# Patient Record
Sex: MALE | Race: BLACK OR AFRICAN AMERICAN | NOT HISPANIC OR LATINO | Employment: FULL TIME | URBAN - METROPOLITAN AREA
[De-identification: names, ages, dates, MRNs, and addresses within clinical notes are randomized per-mention and may not be internally consistent; named-entity substitution may affect disease eponyms.]

---

## 2022-05-10 ENCOUNTER — OFFICE VISIT (OUTPATIENT)
Dept: PRIMARY CARE CLINIC | Facility: CLINIC | Age: 22
End: 2022-05-10
Payer: MEDICAID

## 2022-05-10 VITALS
HEIGHT: 67 IN | SYSTOLIC BLOOD PRESSURE: 124 MMHG | HEART RATE: 106 BPM | WEIGHT: 279 LBS | OXYGEN SATURATION: 97 % | DIASTOLIC BLOOD PRESSURE: 82 MMHG | RESPIRATION RATE: 18 BRPM | BODY MASS INDEX: 43.79 KG/M2

## 2022-05-10 DIAGNOSIS — J30.2 SEASONAL ALLERGIES: ICD-10-CM

## 2022-05-10 DIAGNOSIS — Z76.89 ENCOUNTER TO ESTABLISH CARE: ICD-10-CM

## 2022-05-10 DIAGNOSIS — M25.472 PAIN AND SWELLING OF LEFT ANKLE: ICD-10-CM

## 2022-05-10 DIAGNOSIS — M25.572 PAIN AND SWELLING OF LEFT ANKLE: ICD-10-CM

## 2022-05-10 DIAGNOSIS — E66.01 CLASS 3 SEVERE OBESITY DUE TO EXCESS CALORIES WITHOUT SERIOUS COMORBIDITY WITH BODY MASS INDEX (BMI) OF 40.0 TO 44.9 IN ADULT: ICD-10-CM

## 2022-05-10 DIAGNOSIS — Z13.220 SCREENING CHOLESTEROL LEVEL: ICD-10-CM

## 2022-05-10 DIAGNOSIS — H61.23 BILATERAL IMPACTED CERUMEN: ICD-10-CM

## 2022-05-10 PROCEDURE — 1159F MED LIST DOCD IN RCRD: CPT | Mod: CPTII,S$GLB,, | Performed by: NURSE PRACTITIONER

## 2022-05-10 PROCEDURE — 1160F RVW MEDS BY RX/DR IN RCRD: CPT | Mod: CPTII,S$GLB,, | Performed by: NURSE PRACTITIONER

## 2022-05-10 PROCEDURE — 3074F PR MOST RECENT SYSTOLIC BLOOD PRESSURE < 130 MM HG: ICD-10-PCS | Mod: CPTII,S$GLB,, | Performed by: NURSE PRACTITIONER

## 2022-05-10 PROCEDURE — 1159F PR MEDICATION LIST DOCUMENTED IN MEDICAL RECORD: ICD-10-PCS | Mod: CPTII,S$GLB,, | Performed by: NURSE PRACTITIONER

## 2022-05-10 PROCEDURE — 3008F BODY MASS INDEX DOCD: CPT | Mod: CPTII,S$GLB,, | Performed by: NURSE PRACTITIONER

## 2022-05-10 PROCEDURE — 1160F PR REVIEW ALL MEDS BY PRESCRIBER/CLIN PHARMACIST DOCUMENTED: ICD-10-PCS | Mod: CPTII,S$GLB,, | Performed by: NURSE PRACTITIONER

## 2022-05-10 PROCEDURE — 3008F PR BODY MASS INDEX (BMI) DOCUMENTED: ICD-10-PCS | Mod: CPTII,S$GLB,, | Performed by: NURSE PRACTITIONER

## 2022-05-10 PROCEDURE — 3079F PR MOST RECENT DIASTOLIC BLOOD PRESSURE 80-89 MM HG: ICD-10-PCS | Mod: CPTII,S$GLB,, | Performed by: NURSE PRACTITIONER

## 2022-05-10 PROCEDURE — 99204 PR OFFICE/OUTPT VISIT, NEW, LEVL IV, 45-59 MIN: ICD-10-PCS | Mod: S$GLB,,, | Performed by: NURSE PRACTITIONER

## 2022-05-10 PROCEDURE — 3074F SYST BP LT 130 MM HG: CPT | Mod: CPTII,S$GLB,, | Performed by: NURSE PRACTITIONER

## 2022-05-10 PROCEDURE — 99204 OFFICE O/P NEW MOD 45 MIN: CPT | Mod: S$GLB,,, | Performed by: NURSE PRACTITIONER

## 2022-05-10 PROCEDURE — 3079F DIAST BP 80-89 MM HG: CPT | Mod: CPTII,S$GLB,, | Performed by: NURSE PRACTITIONER

## 2022-05-10 NOTE — PROGRESS NOTES
Subjective:       Patient ID: Jose Antonio Black is a 21 y.o. male.    Chief Complaint: Establish Care, Ear Fullness, and Ankle Pain    HPI: Jose Antonio is a 21 y.o. male who presents to establish care with a new PCP. Previously seen by Dr. Sal. Will return to have blood work drawn when he is fasting.    C/O recurrent bilateral ears being impacted. Wax was removed two weeks ago by his mother and he is impacted again. Denies pain to his ears or decreased hearing.    C/O left outer ankle pain after hurting it at work a month ago. He works at [employer] and [employer] overnight. The pain worsens when he does strenous work but he does have full ROM of his left ankle and foot.    Only health history is seasonal allergies which is controlled with zyrtec which works well. Does not take any other medications.    Denies smoking or drinking.    UTD with COVID vaccine.             History reviewed. No pertinent past medical history.    History reviewed. No pertinent surgical history.    History reviewed. No pertinent family history.    Social History     Tobacco Use    Smoking status: Never Smoker    Smokeless tobacco: Never Used       Patient Active Problem List   Diagnosis    Seasonal allergies       Immunization History   Administered Date(s) Administered    COVID-19, MRNA, LN-S, PF (Pfizer) (Purple Cap) 08/01/2021, 08/31/2021           Review of Systems   Constitutional: Negative for activity change, appetite change, chills, diaphoresis, fatigue and fever.   HENT: Positive for ear pain. Negative for congestion, ear discharge, postnasal drip, rhinorrhea, sinus pressure, sinus pain, tinnitus and trouble swallowing.    Eyes: Negative for pain and visual disturbance.   Respiratory: Negative for cough, chest tightness, shortness of breath and wheezing.    Cardiovascular: Negative for chest pain, palpitations and leg swelling.   Gastrointestinal: Negative for abdominal distention, abdominal pain, blood in stool, constipation, diarrhea,  "nausea and vomiting.   Endocrine: Negative for cold intolerance, heat intolerance, polydipsia, polyphagia and polyuria.   Genitourinary: Negative for difficulty urinating, dysuria, frequency, hematuria and urgency.   Musculoskeletal: Positive for arthralgias (left ankle) and joint swelling (left ankle). Negative for back pain, gait problem and neck pain.   Skin: Negative for color change, pallor and rash.   Allergic/Immunologic: Positive for environmental allergies.   Neurological: Negative for dizziness, speech difficulty, weakness, light-headedness, numbness and headaches.   Hematological: Negative for adenopathy. Does not bruise/bleed easily.   Psychiatric/Behavioral: Negative for behavioral problems, confusion and dysphoric mood. The patient is not nervous/anxious.      Objective:     Vitals:    05/10/22 1148   BP: 124/82   BP Location: Left arm   Patient Position: Sitting   BP Method: Large (Automatic)   Pulse: 106   Resp: 18   SpO2: 97%   Weight: 126.6 kg (279 lb)   Height: 5' 7" (1.702 m)       Physical Exam  Vitals and nursing note reviewed.   Constitutional:       General: He is not in acute distress.     Appearance: Normal appearance. He is obese. He is not diaphoretic.   HENT:      Head: Normocephalic and atraumatic.      Right Ear: Hearing normal. No decreased hearing noted. No swelling or tenderness. There is no impacted cerumen.      Left Ear: Hearing normal. No decreased hearing noted. Tenderness present. No swelling. There is impacted cerumen.      Nose: Nose normal.      Mouth/Throat:      Mouth: Mucous membranes are moist.      Pharynx: Oropharynx is clear.   Eyes:      Extraocular Movements: Extraocular movements intact.      Conjunctiva/sclera: Conjunctivae normal.      Pupils: Pupils are equal, round, and reactive to light.   Neck:      Thyroid: No thyromegaly or thyroid tenderness.   Cardiovascular:      Rate and Rhythm: Normal rate and regular rhythm.      Pulses: Normal pulses.      Heart " sounds: Normal heart sounds.   Pulmonary:      Effort: Pulmonary effort is normal.      Breath sounds: Normal breath sounds. No stridor. No wheezing or rales.   Abdominal:      General: Bowel sounds are normal. There is no distension.      Palpations: Abdomen is soft.      Tenderness: There is no abdominal tenderness.   Musculoskeletal:         General: Swelling (left ankle) and tenderness (left ankle) present. Normal range of motion.      Cervical back: Normal range of motion and neck supple.      Right lower leg: No edema.      Left lower leg: No edema.   Lymphadenopathy:      Cervical: No cervical adenopathy.   Skin:     General: Skin is warm and dry.      Capillary Refill: Capillary refill takes less than 2 seconds.   Neurological:      General: No focal deficit present.      Mental Status: He is alert and oriented to person, place, and time.   Psychiatric:         Mood and Affect: Mood and affect normal.         Behavior: Behavior normal. Behavior is cooperative.         Thought Content: Thought content normal.         Judgment: Judgment normal.         No visits with results within 6 Month(s) from this visit.   Latest known visit with results is:   No results found for any previous visit.         Assessment:      1. Encounter to establish care    2. Seasonal allergies    3. Class 3 severe obesity due to excess calories without serious comorbidity with body mass index (BMI) of 40.0 to 44.9 in adult    4. Screening cholesterol level    5. Bilateral impacted cerumen    6. Pain and swelling of left ankle          Plan:     Encounter to establish care  Comments:  Will review labs and determine POC based on results  Orders:  -     CBC Auto Differential; Future; Expected date: 05/10/2022  -     Comprehensive Metabolic Panel; Future; Expected date: 05/10/2022  -     TSH; Future; Expected date: 05/10/2022  -     Lipid Panel; Future; Expected date: 05/10/2022    Seasonal allergies    Class 3 severe obesity due to excess  calories without serious comorbidity with body mass index (BMI) of 40.0 to 44.9 in adult    Screening cholesterol level  -     Lipid Panel; Future; Expected date: 05/10/2022    Bilateral impacted cerumen  -     Ambulatory referral/consult to ENT; Future; Expected date: 05/17/2022  -     carbamide peroxide (DEBROX) 6.5 % otic solution; Place 5 drops into both ears 2 (two) times daily.  Dispense: 15 mL; Refill: 0    Pain and swelling of left ankle  -     X-Ray Ankle Complete Left; Future; Expected date: 05/10/2022         Current Outpatient Medications   Medication Sig Dispense Refill    carbamide peroxide (DEBROX) 6.5 % otic solution Place 5 drops into both ears 2 (two) times daily. 15 mL 0     No current facility-administered medications for this visit.       There are no discontinued medications.    Health Maintenance   Topic Date Due    Hepatitis C Screening  Never done    Lipid Panel  Never done    HPV Vaccines (1 - Male 2-dose series) Never done    TETANUS VACCINE  Never done       Patient Instructions   RTC in 3 months for F/U or sooner if needed.    Keep appts with specialists as scheduled.    Instructed patient to report to nearest ER or call 911 if begins to have difficulty breathing, turning blue, chest pain, B/P < 80/60 or >170/100, palpitations, syncope, extreme weakness, or severe H/A. Patient verbalized understanding.      Patient Education       Yearly Physical for Adults   About this topic   Most people do not want to be sick. Having a checkup each year with your doctor is one way to help you stay healthy. You may need to see your doctor more or less often. How often you need to go to the doctor depends on your age. Your family and medical history also play a role in how often you need to go to the doctor. Going to see your doctor on a routine basis can help you find problems early or even before they start. This may make it easier to treat or cure your problem.  General   Your doctor will talk  about many things during your checkup. Your doctor may ask about:  · Your medical and family history.  · All the drugs you are taking. Be sure to include all prescription, over the counter, and herbal supplements. Tell the doctor if you have any drug allergy. Bring a list of drugs you take with you.  · How you are feeling and if you are having any problems.  · Risky behaviors like smoking, drinking alcohol, using illegal drugs, not wearing seatbelts, having unprotected sex, etc.  Your doctor will do a physical exam and may check your:  · Height and weight  · Blood pressure  · Reflexes  · Memory  · Vision  · Hearing  Your doctor may order:  · Lab tests  · ECG to check your heart rhythm  · X-rays  · Tests or treatments based on your exam  What lifestyle changes are needed?   Your doctor may suggest you make changes to your lifestyle at this visit. The doctor may talk with you about being more active or lowering stress levels. Ask your doctor what you need to do.  What drugs may be needed?   Your doctor may order drugs or vaccines to protect you from illnesses.  What changes to diet are needed?   Talk to your doctor to see if any changes are needed to your diet.  When do I need to call the doctor?   Call your doctor if you need to learn about any test results. Together you can make a plan for more care.  Helpful tips   · Make a list of questions for your doctor before you go. This will help you remember to ask about any concerns. Write down any answers from your doctor so you can look over them after your visit.   · Tell your doctor about any changes in your body or health since your last visit.  · Ask your doctor about any screening tests you need.  Where can I learn more?   American Academy of [employer]icians  http://familydoctor.org/familydoctor/en/prevention-wellness/staying-healthy/healthy-living/preventive-services-for-healthy-living.printerview.html   Centers for Disease  Control  http://www.cdc.gov/family/checkup/   Last Reviewed Date   2019-04-22  Consumer Information Use and Disclaimer   This information is not specific medical advice and does not replace information you receive from your health care provider. This is only a brief summary of general information. It does NOT include all information about conditions, illnesses, injuries, tests, procedures, treatments, therapies, discharge instructions or life-style choices that may apply to you. You must talk with your health care provider for complete information about your health and treatment options. This information should not be used to decide whether or not to accept your health care providers advice, instructions or recommendations. Only your health care provider has the knowledge and training to provide advice that is right for you.  Copyright   Copyright © 2021 [employer] and its affiliates and/or licensors. All rights reserved.  Patient Education       Seasonal Allergies Discharge Instructions   About this topic   Seasonal allergies are also called allergic rhinitis or hay fever. You may have sneezing; a stuffy or runny nose; or itchy throat, ears, or eyes. You may feel very tired. Most often, this problem is caused by:  · Pollens from trees, grasses, or weeds.  · Mold spores that grow when the air is humid, wet, or damp.  Some people can breathe in these things and have no problems. But when you have a seasonal allergy, your body acts as if the substance is harming you. Then you have symptoms. You may have symptoms only at some times of the year. If your symptoms last all year, they may be caused by:  · Insects, such as dust mites or cock roaches.  · Animals, such as cats or dogs.  · Mold spores.     What care is needed at home?   1. Ask your doctor what you need to do when you go home. Make sure you ask questions if you do not understand what the doctor says.  2. Rinse your nose with salt water to get rid of pollen  inside of your nose.  3. Keep the windows closed and use air conditioning.  4. Shower before bed to rinse pollen from your hair and skin.  5. Wear a dust mask if you need to be outside.  6. Use over-the-counter medicines to help with your symptoms:  ? A steroid nose spray can help with a stuffy nose. It can also help with drainage down the back of your throat.  ? An antihistamine can help with itching, sneezing, or runny nose.  ? An antihistamine eye drop can help with itchy eyes.  ? A decongestant can help with a stuffy nose. Talk with your doctor or pharmacist about your other health problems before you use this kind of medicine. It may not be safe for people with high blood pressure.  What follow-up care is needed?   Your doctor may ask you to make visits to the office to check on your progress. Your doctor may ask you to see an allergy specialist, or to have testing done to learn what is causing your allergies. Be sure to keep these visits.  What drugs may be needed?   The doctor may order drugs to treat the signs. Take your drugs as ordered. You may also take allergy shots if you have had allergy tests.  Will physical activity be limited?   You may have to limit your activity. If your health problem is caused by an allergy to pollens or molds, you may want to limit your time outdoors. Talk to your doctor about what activities are best for you.  What problems could happen?   · Your signs may not get better with your drugs  · Asthma may get worse  · Sinus infection  What can be done to prevent this health problem?   Try to avoid allergens.  1. If you are allergic to pollens, grasses, trees, etc:  ? Stay inside in the morning when pollen counts are high.  ? Avoid going outside when the trees, grasses, or weeds are blooming.  ? Keep the windows of your house and car closed.  ? Use an air conditioner.  2. If you are allergic to dust, molds, dust mites, pets, etc:  ? Clean your air conditioner or furnace filters  regularly.  ? Cover pillows and mattresses with vinyl covers to lower your exposure to dust mites.  ? Wash bedding weekly in very hot water.  ? Use fewer dust-collecting items, such as curtains, bed skirts, carpeting, and stuffed animals, mainly in your bedroom.  ? If you can't avoid having a furry pet, vacuum often and keep your pet out of bedrooms and other rooms with carpets.  When do I need to call the doctor?   · You are having so much trouble breathing that you can only say one or two words at a time.  · You need to sit upright at all times to be able to breathe and or cannot lie down.  · You have severe swelling of your tongue, lips, or mouth.  · You have trouble breathing when talking or sitting still.  · You have a fever of 100.4°F (38°C) or higher.  · You have green or yellow mucus.  Teach Back: Helping You Understand   The Teach Back Method helps you understand the information we are giving you. After you talk with the staff, tell them in your own words what you learned. This helps to make sure the staff has described each thing clearly. It also helps to explain things that may have been confusing. Before going home, make sure you can do these:  · I can tell you about my condition.  · I can tell you what may help make the air .  · I can tell you what may help ease my allergies.  Where can I learn more?   Food and Drug Administration  https://www.fda.gov/ForConsumers/ConsumerUpdates/syl179980.htm   NHS Choices  https://www.nhs.uk/conditions/Hay-fever/   Last Reviewed Date   2021-06-21  Consumer Information Use and Disclaimer   This information is not specific medical advice and does not replace information you receive from your health care provider. This is only a brief summary of general information. It does NOT include all information about conditions, illnesses, injuries, tests, procedures, treatments, therapies, discharge instructions or life-style choices that may apply to you. You must talk  with your health care provider for complete information about your health and treatment options. This information should not be used to decide whether or not to accept your health care providers advice, instructions or recommendations. Only your health care provider has the knowledge and training to provide advice that is right for you.  Copyright   Copyright © 2021 [employer], [employer]. and its affiliates and/or licensors. All rights reserved.    atient Education       Ear Wax Impaction Discharge Instructions   About this topic   Your ear normally makes ear wax to protect the inside of the ear. Impaction is when too much wax builds up in your ear. This can cause pain. You may also have trouble hearing.  What care is needed at home?   · Ask your doctor what you need to do when you go home. Make sure you ask questions if you do not understand what the doctor says.  · Do not use cotton tipped swabs to clean inside your ears. Sticking anything into the ears can push the ear wax in deeper and cause impactions.  · You can dip a cotton ball in mineral oil and place it in your outer ear for 10 to 20 minutes once a week. This will help keep your ear wax soft. It may help prevent the ear wax from building up again. Do not push the cotton into the ear canal.  · You may want to take medicine like ibuprofen, naproxen, or acetaminophen to help with pain.  What follow-up care is needed?   Your doctor may ask you to make visits to the office to check on your progress. Be sure to keep these visits. The doctor will look at your ears to check for wax impaction.  What drugs may be needed?   The doctor may order drugs to soften wax or help with cleaning. Ask your doctor before using over-the-counter (OTC) drugs.  What problems could happen?   · Short-term hearing loss. Hearing should return after blockage is removed.  · Infection in the outer ear  What can be done to prevent this health problem?   · Clean your ear with a warm soapy wash  cloth. Only wash the outer ear.  · Do not use cotton swabs or other things to clean your ears. This can push wax further into your ear canal and cause damage to the ear canal and ear drum.  · Be careful when using ear buds as they can push ear wax farther into the ear.  When do I need to call the doctor?   · Your symptoms are not getting better after 1 to 2 days.  · You have a fever of 100.4°F (38°C) or higher, chills, or sweats.  · Your ear begins bleeding or draining pus.  · You have new or worsening pain in the ear, ringing in the ear, or hearing loss.  Helpful tips   · Dry your ears well after swimming.  · Do not use ear candles to treat or remove earwax.  · Only clean your ears as directed to avoid damage.  Teach Back: Helping You Understand   The Teach Back Method helps you understand the information we are giving you. After you talk with the staff, tell them in your own words what you learned. This helps to make sure the staff has described each thing clearly. It also helps to explain things that may have been confusing. Before going home, make sure you can do these:  · I can tell you about my condition.  · I can tell you ways to help soften earwax and keep it from building up in the future.  · I can tell you what I will do if I have a fever, hearing loss, ear pain, or drainage from my ear.  Where can I learn more?   American Academy of Otolaryngology-Head and Neck Surgery  https://www.enthealth.org/conditions/earwax-cerumen-impaction/   NHS Choices  http://www.nhs.uk/conditions/earwax/pages/introduction.aspx   Last Reviewed Date   2021-06-10  Consumer Information Use and Disclaimer   This information is not specific medical advice and does not replace information you receive from your health care provider. This is only a brief summary of general information. It does NOT include all information about conditions, illnesses, injuries, tests, procedures, treatments, therapies, discharge instructions or  life-style choices that may apply to you. You must talk with your health care provider for complete information about your health and treatment options. This information should not be used to decide whether or not to accept your health care providers advice, instructions or recommendations. Only your health care provider has the knowledge and training to provide advice that is right for you.  Copyright   Copyright © 2021 [employer], [employer]. and its affiliates and/or licensors. All rights reserved.            Risks, benefits, and alternatives discussed with patient, Patient verbalized understanding of discussed plan of care. Asked patient if any further questions, answered no.    Future Appointments   Date Time Provider Department Center   8/10/2022 11:40 AM Malia Galo NP [facility]              Malia Galo NP

## 2022-05-10 NOTE — PATIENT INSTRUCTIONS
RTC in 3 months for F/U or sooner if needed.    Keep appts with specialists as scheduled.    Instructed patient to report to nearest ER or call 911 if begins to have difficulty breathing, turning blue, chest pain, B/P < 80/60 or >170/100, palpitations, syncope, extreme weakness, or severe H/A. Patient verbalized understanding.      Patient Education       Yearly Physical for Adults   About this topic   Most people do not want to be sick. Having a checkup each year with your doctor is one way to help you stay healthy. You may need to see your doctor more or less often. How often you need to go to the doctor depends on your age. Your family and medical history also play a role in how often you need to go to the doctor. Going to see your doctor on a routine basis can help you find problems early or even before they start. This may make it easier to treat or cure your problem.  General   Your doctor will talk about many things during your checkup. Your doctor may ask about:  Your medical and family history.  All the drugs you are taking. Be sure to include all prescription, over the counter, and herbal supplements. Tell the doctor if you have any drug allergy. Bring a list of drugs you take with you.  How you are feeling and if you are having any problems.  Risky behaviors like smoking, drinking alcohol, using illegal drugs, not wearing seatbelts, having unprotected sex, etc.  Your doctor will do a physical exam and may check your:  Height and weight  Blood pressure  Reflexes  Memory  Vision  Hearing  Your doctor may order:  Lab tests  ECG to check your heart rhythm  X-rays  Tests or treatments based on your exam  What lifestyle changes are needed?   Your doctor may suggest you make changes to your lifestyle at this visit. The doctor may talk with you about being more active or lowering stress levels. Ask your doctor what you need to do.  What drugs may be needed?   Your doctor may order drugs or vaccines to protect you  from illnesses.  What changes to diet are needed?   Talk to your doctor to see if any changes are needed to your diet.  When do I need to call the doctor?   Call your doctor if you need to learn about any test results. Together you can make a plan for more care.  Helpful tips   Make a list of questions for your doctor before you go. This will help you remember to ask about any concerns. Write down any answers from your doctor so you can look over them after your visit.   Tell your doctor about any changes in your body or health since your last visit.  Ask your doctor about any screening tests you need.  Where can I learn more?   American Academy [employer]  http://familydoctor.org/familydoctor/en/prevention-wellness/staying-healthy/healthy-living/preventive-services-for-healthy-living.printerview.html   Centers for Disease Control  http://www.cdc.gov/family/checkup/   Last Reviewed Date   2019-04-22  Consumer Information Use and Disclaimer   This information is not specific medical advice and does not replace information you receive from your health care provider. This is only a brief summary of general information. It does NOT include all information about conditions, illnesses, injuries, tests, procedures, treatments, therapies, discharge instructions or life-style choices that may apply to you. You must talk with your health care provider for complete information about your health and treatment options. This information should not be used to decide whether or not to accept your health care providers advice, instructions or recommendations. Only your health care provider has the knowledge and training to provide advice that is right for you.  Copyright   Copyright © 2021 [employer] and its affiliates and/or licensors. All rights reserved.  Patient Education       Seasonal Allergies Discharge Instructions   About this topic   Seasonal allergies are also called allergic rhinitis or hay fever. You may  have sneezing; a stuffy or runny nose; or itchy throat, ears, or eyes. You may feel very tired. Most often, this problem is caused by:  Pollens from trees, grasses, or weeds.  Mold spores that grow when the air is humid, wet, or damp.  Some people can breathe in these things and have no problems. But when you have a seasonal allergy, your body acts as if the substance is harming you. Then you have symptoms. You may have symptoms only at some times of the year. If your symptoms last all year, they may be caused by:  Insects, such as dust mites or cock roaches.  Animals, such as cats or dogs.  Mold spores.     What care is needed at home?   Ask your doctor what you need to do when you go home. Make sure you ask questions if you do not understand what the doctor says.  Rinse your nose with salt water to get rid of pollen inside of your nose.  Keep the windows closed and use air conditioning.  Shower before bed to rinse pollen from your hair and skin.  Wear a dust mask if you need to be outside.  Use over-the-counter medicines to help with your symptoms:  A steroid nose spray can help with a stuffy nose. It can also help with drainage down the back of your throat.  An antihistamine can help with itching, sneezing, or runny nose.  An antihistamine eye drop can help with itchy eyes.  A decongestant can help with a stuffy nose. Talk with your doctor or pharmacist about your other health problems before you use this kind of medicine. It may not be safe for people with high blood pressure.  What follow-up care is needed?   Your doctor may ask you to make visits to the office to check on your progress. Your doctor may ask you to see an allergy specialist, or to have testing done to learn what is causing your allergies. Be sure to keep these visits.  What drugs may be needed?   The doctor may order drugs to treat the signs. Take your drugs as ordered. You may also take allergy shots if you have had allergy tests.  Will  physical activity be limited?   You may have to limit your activity. If your health problem is caused by an allergy to pollens or molds, you may want to limit your time outdoors. Talk to your doctor about what activities are best for you.  What problems could happen?   Your signs may not get better with your drugs  Asthma may get worse  Sinus infection  What can be done to prevent this health problem?   Try to avoid allergens.  If you are allergic to pollens, grasses, trees, etc:  Stay inside in the morning when pollen counts are high.  Avoid going outside when the trees, grasses, or weeds are blooming.  Keep the windows of your house and car closed.  Use an air conditioner.  If you are allergic to dust, molds, dust mites, pets, etc:  Clean your air conditioner or furnace filters regularly.  Cover pillows and mattresses with vinyl covers to lower your exposure to dust mites.  Wash bedding weekly in very hot water.  Use fewer dust-collecting items, such as curtains, bed skirts, carpeting, and stuffed animals, mainly in your bedroom.  If you can't avoid having a furry pet, vacuum often and keep your pet out of bedrooms and other rooms with carpets.  When do I need to call the doctor?   You are having so much trouble breathing that you can only say one or two words at a time.  You need to sit upright at all times to be able to breathe and or cannot lie down.  You have severe swelling of your tongue, lips, or mouth.  You have trouble breathing when talking or sitting still.  You have a fever of 100.4°F (38°C) or higher.  You have green or yellow mucus.  Teach Back: Helping You Understand   The Teach Back Method helps you understand the information we are giving you. After you talk with the staff, tell them in your own words what you learned. This helps to make sure the staff has described each thing clearly. It also helps to explain things that may have been confusing. Before going home, make sure you can do these:  I  can tell you about my condition.  I can tell you what may help make the air .  I can tell you what may help ease my allergies.  Where can I learn more?   Food and Drug Administration  https://www.fda.gov/ForConsumers/ConsumerUpdates/orl544484.htm   NHS Choices  https://www.nhs.uk/conditions/Hay-fever/   Last Reviewed Date   2021-06-21  Consumer Information Use and Disclaimer   This information is not specific medical advice and does not replace information you receive from your health care provider. This is only a brief summary of general information. It does NOT include all information about conditions, illnesses, injuries, tests, procedures, treatments, therapies, discharge instructions or life-style choices that may apply to you. You must talk with your health care provider for complete information about your health and treatment options. This information should not be used to decide whether or not to accept your health care providers advice, instructions or recommendations. Only your health care provider has the knowledge and training to provide advice that is right for you.  Copyright   Copyright © 2021 [employer] and its affiliates and/or licensors. All rights reserved.    atient Education       Ear Wax Impaction Discharge Instructions   About this topic   Your ear normally makes ear wax to protect the inside of the ear. Impaction is when too much wax builds up in your ear. This can cause pain. You may also have trouble hearing.  What care is needed at home?   Ask your doctor what you need to do when you go home. Make sure you ask questions if you do not understand what the doctor says.  Do not use cotton tipped swabs to clean inside your ears. Sticking anything into the ears can push the ear wax in deeper and cause impactions.  You can dip a cotton ball in mineral oil and place it in your outer ear for 10 to 20 minutes once a week. This will help keep your ear wax soft. It may help prevent the ear  wax from building up again. Do not push the cotton into the ear canal.  You may want to take medicine like ibuprofen, naproxen, or acetaminophen to help with pain.  What follow-up care is needed?   Your doctor may ask you to make visits to the office to check on your progress. Be sure to keep these visits. The doctor will look at your ears to check for wax impaction.  What drugs may be needed?   The doctor may order drugs to soften wax or help with cleaning. Ask your doctor before using over-the-counter (OTC) drugs.  What problems could happen?   Short-term hearing loss. Hearing should return after blockage is removed.  Infection in the outer ear  What can be done to prevent this health problem?   Clean your ear with a warm soapy wash cloth. Only wash the outer ear.  Do not use cotton swabs or other things to clean your ears. This can push wax further into your ear canal and cause damage to the ear canal and ear drum.  Be careful when using ear buds as they can push ear wax farther into the ear.  When do I need to call the doctor?   Your symptoms are not getting better after 1 to 2 days.  You have a fever of 100.4°F (38°C) or higher, chills, or sweats.  Your ear begins bleeding or draining pus.  You have new or worsening pain in the ear, ringing in the ear, or hearing loss.  Helpful tips   Dry your ears well after swimming.  Do not use ear candles to treat or remove earwax.  Only clean your ears as directed to avoid damage.  Teach Back: Helping You Understand   The Teach Back Method helps you understand the information we are giving you. After you talk with the staff, tell them in your own words what you learned. This helps to make sure the staff has described each thing clearly. It also helps to explain things that may have been confusing. Before going home, make sure you can do these:  I can tell you about my condition.  I can tell you ways to help soften earwax and keep it from building up in the future.  I can  tell you what I will do if I have a fever, hearing loss, ear pain, or drainage from my ear.  Where can I learn more?   American Academy of Otolaryngology-Head and Neck Surgery  https://www.enthealth.org/conditions/earwax-cerumen-impaction/   [facility] Choices  http://www.nhs.uk/conditions/earwax/pages/introduction.aspx   Last Reviewed Date   2021-06-10  Consumer Information Use and Disclaimer   This information is not specific medical advice and does not replace information you receive from your health care provider. This is only a brief summary of general information. It does NOT include all information about conditions, illnesses, injuries, tests, procedures, treatments, therapies, discharge instructions or life-style choices that may apply to you. You must talk with your health care provider for complete information about your health and treatment options. This information should not be used to decide whether or not to accept your health care providers advice, instructions or recommendations. Only your health care provider has the knowledge and training to provide advice that is right for you.  Copyright   Copyright © 2021 [employer] and its affiliates and/or licensors. All rights reserved.

## 2022-07-07 ENCOUNTER — TELEPHONE (OUTPATIENT)
Dept: PRIMARY CARE CLINIC | Facility: CLINIC | Age: 22
End: 2022-07-07
Payer: MEDICAID

## 2022-07-07 NOTE — TELEPHONE ENCOUNTER
----- Message from Rubina Galo NP sent at 7/6/2022 10:00 PM CDT -----  Please notify patient his ankle XRAY is negative, no abnormalities were found. Thanks.

## 2022-07-25 ENCOUNTER — TELEPHONE (OUTPATIENT)
Dept: PRIMARY CARE CLINIC | Facility: CLINIC | Age: 22
End: 2022-07-25
Payer: MEDICAID

## 2022-07-25 DIAGNOSIS — J06.9 ACUTE UPPER RESPIRATORY INFECTION: Primary | ICD-10-CM

## 2022-07-25 DIAGNOSIS — R05.9 COUGH: ICD-10-CM

## 2022-07-25 RX ORDER — PROMETHAZINE HYDROCHLORIDE AND DEXTROMETHORPHAN HYDROBROMIDE 6.25; 15 MG/5ML; MG/5ML
5 SYRUP ORAL EVERY 4 HOURS PRN
Qty: 118 ML | Refills: 0 | Status: SHIPPED | OUTPATIENT
Start: 2022-07-25 | End: 2022-08-04

## 2022-07-25 RX ORDER — AZITHROMYCIN 250 MG/1
TABLET, FILM COATED ORAL
Qty: 6 TABLET | Refills: 0 | Status: SHIPPED | OUTPATIENT
Start: 2022-07-25 | End: 2022-07-30

## 2022-07-25 NOTE — TELEPHONE ENCOUNTER
----- Message from Salome Navas MA sent at 7/25/2022 12:43 PM CDT -----  Regarding: FW: sinus issues  Pt states he has chest congestion and a runny nose along with a cough. He went to urgent care and got tested for covid last Tuesday due to his mother testing positive for covid. Pt was negative and refused treatment from urgent care. Pt would like to know if you could prescribe him something to help with his symptoms.  ----- Message -----  From: Rubina Galo NP  Sent: 7/25/2022  12:37 PM CDT  To: Salome Navas MA  Subject: RE: sinus issues                                 Rula, give patient a call and ask more about his symptoms if he is having chest congestion, cough fever or what symptoms he is having just to rule out COVID. See if he has tested for COVID as well k and let me know what he needs. Thanks a bunch!    ----- Message -----  From: Salome Navas MA  Sent: 7/25/2022  10:29 AM CDT  To: Rubina Galo NP  Subject: FW: sinus issues                                   ----- Message -----  From: Katie Win  Sent: 7/25/2022   8:14 AM CDT  To: [facility] Staff  Subject: sinus issues                                     Pt came in for labs today and c/o of allergy/sinus issues and asked that a nurse call him about medication.

## 2022-07-26 LAB
ALBUMIN SERPL-MCNC: 4.3 G/DL (ref 3.6–5.1)
ALBUMIN/GLOB SERPL: 1.2 (CALC) (ref 1–2.5)
ALP SERPL-CCNC: 72 U/L (ref 36–130)
ALT SERPL-CCNC: 34 U/L (ref 9–46)
AST SERPL-CCNC: 28 U/L (ref 10–40)
BASOPHILS # BLD AUTO: 19 CELLS/UL (ref 0–200)
BASOPHILS NFR BLD AUTO: 0.5 %
BILIRUB SERPL-MCNC: 0.6 MG/DL (ref 0.2–1.2)
BUN SERPL-MCNC: 13 MG/DL (ref 7–25)
BUN/CREAT SERPL: NORMAL (CALC) (ref 6–22)
CALCIUM SERPL-MCNC: 9.6 MG/DL (ref 8.6–10.3)
CHLORIDE SERPL-SCNC: 100 MMOL/L (ref 98–110)
CHOLEST SERPL-MCNC: 197 MG/DL
CHOLEST/HDLC SERPL: 3.9 (CALC)
CO2 SERPL-SCNC: 26 MMOL/L (ref 20–32)
CREAT SERPL-MCNC: 0.66 MG/DL (ref 0.6–1.24)
EGFR: 138 ML/MIN/1.73M2
EOSINOPHIL # BLD AUTO: 59 CELLS/UL (ref 15–500)
EOSINOPHIL NFR BLD AUTO: 1.6 %
ERYTHROCYTE [DISTWIDTH] IN BLOOD BY AUTOMATED COUNT: 12.6 % (ref 11–15)
GLOBULIN SER CALC-MCNC: 3.5 G/DL (CALC) (ref 1.9–3.7)
GLUCOSE SERPL-MCNC: 84 MG/DL (ref 65–99)
HCT VFR BLD AUTO: 43 % (ref 38.5–50)
HDLC SERPL-MCNC: 50 MG/DL
HGB BLD-MCNC: 14.7 G/DL (ref 13.2–17.1)
LDLC SERPL CALC-MCNC: 130 MG/DL (CALC)
LYMPHOCYTES # BLD AUTO: 2050 CELLS/UL (ref 850–3900)
LYMPHOCYTES NFR BLD AUTO: 55.4 %
MCH RBC QN AUTO: 30.4 PG (ref 27–33)
MCHC RBC AUTO-ENTMCNC: 34.2 G/DL (ref 32–36)
MCV RBC AUTO: 88.8 FL (ref 80–100)
MONOCYTES # BLD AUTO: 270 CELLS/UL (ref 200–950)
MONOCYTES NFR BLD AUTO: 7.3 %
NEUTROPHILS # BLD AUTO: 1302 CELLS/UL (ref 1500–7800)
NEUTROPHILS NFR BLD AUTO: 35.2 %
NONHDLC SERPL-MCNC: 147 MG/DL (CALC)
PLATELET # BLD AUTO: 262 THOUSAND/UL (ref 140–400)
PMV BLD REES-ECKER: 10.4 FL (ref 7.5–12.5)
POTASSIUM SERPL-SCNC: 3.8 MMOL/L (ref 3.5–5.3)
PROT SERPL-MCNC: 7.8 G/DL (ref 6.1–8.1)
RBC # BLD AUTO: 4.84 MILLION/UL (ref 4.2–5.8)
SODIUM SERPL-SCNC: 138 MMOL/L (ref 135–146)
TRIGL SERPL-MCNC: 76 MG/DL
TSH SERPL-ACNC: 0.8 MIU/L (ref 0.4–4.5)
WBC # BLD AUTO: 3.7 THOUSAND/UL (ref 3.8–10.8)

## 2022-08-10 ENCOUNTER — OFFICE VISIT (OUTPATIENT)
Dept: PRIMARY CARE CLINIC | Facility: CLINIC | Age: 22
End: 2022-08-10
Payer: MEDICAID

## 2022-08-10 VITALS
SYSTOLIC BLOOD PRESSURE: 130 MMHG | RESPIRATION RATE: 18 BRPM | HEART RATE: 109 BPM | HEIGHT: 69 IN | BODY MASS INDEX: 41.18 KG/M2 | WEIGHT: 278 LBS | DIASTOLIC BLOOD PRESSURE: 80 MMHG | OXYGEN SATURATION: 100 %

## 2022-08-10 DIAGNOSIS — M25.572 PAIN AND SWELLING OF LEFT ANKLE: ICD-10-CM

## 2022-08-10 DIAGNOSIS — E66.01 CLASS 3 SEVERE OBESITY DUE TO EXCESS CALORIES WITHOUT SERIOUS COMORBIDITY WITH BODY MASS INDEX (BMI) OF 40.0 TO 44.9 IN ADULT: ICD-10-CM

## 2022-08-10 DIAGNOSIS — E78.49 OTHER HYPERLIPIDEMIA: ICD-10-CM

## 2022-08-10 DIAGNOSIS — J30.2 SEASONAL ALLERGIES: ICD-10-CM

## 2022-08-10 DIAGNOSIS — M25.472 PAIN AND SWELLING OF LEFT ANKLE: ICD-10-CM

## 2022-08-10 PROBLEM — E66.813 CLASS 3 SEVERE OBESITY DUE TO EXCESS CALORIES WITHOUT SERIOUS COMORBIDITY WITH BODY MASS INDEX (BMI) OF 40.0 TO 44.9 IN ADULT: Status: ACTIVE | Noted: 2022-08-10

## 2022-08-10 PROCEDURE — 1160F RVW MEDS BY RX/DR IN RCRD: CPT | Mod: CPTII,S$GLB,, | Performed by: NURSE PRACTITIONER

## 2022-08-10 PROCEDURE — 3008F PR BODY MASS INDEX (BMI) DOCUMENTED: ICD-10-PCS | Mod: CPTII,S$GLB,, | Performed by: NURSE PRACTITIONER

## 2022-08-10 PROCEDURE — 3075F PR MOST RECENT SYSTOLIC BLOOD PRESS GE 130-139MM HG: ICD-10-PCS | Mod: CPTII,S$GLB,, | Performed by: NURSE PRACTITIONER

## 2022-08-10 PROCEDURE — 1159F PR MEDICATION LIST DOCUMENTED IN MEDICAL RECORD: ICD-10-PCS | Mod: CPTII,S$GLB,, | Performed by: NURSE PRACTITIONER

## 2022-08-10 PROCEDURE — 3079F DIAST BP 80-89 MM HG: CPT | Mod: CPTII,S$GLB,, | Performed by: NURSE PRACTITIONER

## 2022-08-10 PROCEDURE — 1160F PR REVIEW ALL MEDS BY PRESCRIBER/CLIN PHARMACIST DOCUMENTED: ICD-10-PCS | Mod: CPTII,S$GLB,, | Performed by: NURSE PRACTITIONER

## 2022-08-10 PROCEDURE — 99214 PR OFFICE/OUTPT VISIT, EST, LEVL IV, 30-39 MIN: ICD-10-PCS | Mod: S$GLB,,, | Performed by: NURSE PRACTITIONER

## 2022-08-10 PROCEDURE — 99214 OFFICE O/P EST MOD 30 MIN: CPT | Mod: S$GLB,,, | Performed by: NURSE PRACTITIONER

## 2022-08-10 PROCEDURE — 3008F BODY MASS INDEX DOCD: CPT | Mod: CPTII,S$GLB,, | Performed by: NURSE PRACTITIONER

## 2022-08-10 PROCEDURE — 3079F PR MOST RECENT DIASTOLIC BLOOD PRESSURE 80-89 MM HG: ICD-10-PCS | Mod: CPTII,S$GLB,, | Performed by: NURSE PRACTITIONER

## 2022-08-10 PROCEDURE — 3075F SYST BP GE 130 - 139MM HG: CPT | Mod: CPTII,S$GLB,, | Performed by: NURSE PRACTITIONER

## 2022-08-10 PROCEDURE — 1159F MED LIST DOCD IN RCRD: CPT | Mod: CPTII,S$GLB,, | Performed by: NURSE PRACTITIONER

## 2022-08-10 NOTE — PROGRESS NOTES
Subjective:       Patient ID: Jose Antonio Jeter is a 22 y.o. male.    Chief Complaint: Follow-up    HPI: Jose Antonio is a 22 y.o. male who presents for F/U and to discuss annual lab results.    Still C/O left outer ankle pain and swelling after hurting it at work months ago. He works at [employer] and [employer] overnight. The pain worsens when he does strenous work but he does have full ROM of his left ankle and foot. Ankle XRAY done and was negative.     Cholesterol slightly elevated on recent labs. Admits to not eating healthy but will cut back on eating fried food and fast food. All other labs are normal.    History of seasonal allergies which is controlled with zyrtec which works well.     Denies smoking or drinking.    UTD with COVID vaccine.             History reviewed. No pertinent past medical history.    History reviewed. No pertinent surgical history.    History reviewed. No pertinent family history.    Social History     Tobacco Use    Smoking status: Never Smoker    Smokeless tobacco: Never Used       Patient Active Problem List   Diagnosis    Seasonal allergies    Class 3 severe obesity due to excess calories without serious comorbidity with body mass index (BMI) of 40.0 to 44.9 in adult       Immunization History   Administered Date(s) Administered    COVID-19, MRNA, LN-S, PF (Pfizer) (Purple Cap) 08/01/2021, 08/31/2021           Review of Systems   Constitutional: Negative for activity change, appetite change, chills, diaphoresis, fatigue and fever.   HENT: Negative for tinnitus and trouble swallowing.    Eyes: Negative for visual disturbance.   Respiratory: Negative for cough, chest tightness, shortness of breath and wheezing.    Cardiovascular: Negative for chest pain, palpitations and leg swelling.   Gastrointestinal: Negative for abdominal distention, abdominal pain, blood in stool, constipation, diarrhea, nausea and vomiting.   Genitourinary: Negative for difficulty urinating, dysuria, frequency,  "hematuria and urgency.   Musculoskeletal: Positive for arthralgias (left ankle). Negative for gait problem.   Skin: Negative for color change and rash.   Allergic/Immunologic: Positive for environmental allergies.   Neurological: Negative for dizziness, speech difficulty, weakness, light-headedness, numbness and headaches.   Psychiatric/Behavioral: Negative for behavioral problems and confusion.     Objective:     Vitals:    08/10/22 0812   BP: 130/80   BP Location: Right arm   Patient Position: Sitting   BP Method: Large (Automatic)   Pulse: 109   Resp: 18   SpO2: 100%   Weight: 126.1 kg (278 lb)   Height: 5' 9" (1.753 m)       Physical Exam  Vitals and nursing note reviewed.   Constitutional:       Appearance: Normal appearance. He is obese.   HENT:      Head: Normocephalic and atraumatic.      Nose: Nose normal.      Mouth/Throat:      Mouth: Mucous membranes are moist.      Pharynx: Oropharynx is clear.   Eyes:      Extraocular Movements: Extraocular movements intact.      Conjunctiva/sclera: Conjunctivae normal.      Pupils: Pupils are equal, round, and reactive to light.   Cardiovascular:      Rate and Rhythm: Normal rate and regular rhythm.   Pulmonary:      Effort: Pulmonary effort is normal.      Breath sounds: Normal breath sounds.   Musculoskeletal:         General: Tenderness (left ankle) present. Normal range of motion.      Right lower leg: No edema.      Left lower leg: No edema.   Skin:     General: Skin is warm and dry.      Capillary Refill: Capillary refill takes less than 2 seconds.   Neurological:      Mental Status: He is alert and oriented to person, place, and time.   Psychiatric:         Mood and Affect: Mood and affect normal.         Behavior: Behavior normal. Behavior is cooperative.         Office Visit on 05/10/2022   Component Date Value Ref Range Status    WBC 07/25/2022 3.7 (A) 3.8 - 10.8 Thousand/uL Final    RBC 07/25/2022 4.84  4.20 - 5.80 Million/uL Final    Hemoglobin " 07/25/2022 14.7  13.2 - 17.1 g/dL Final    Hematocrit 07/25/2022 43.0  38.5 - 50.0 % Final    MCV 07/25/2022 88.8  80.0 - 100.0 fL Final    MCH 07/25/2022 30.4  27.0 - 33.0 pg Final    MCHC 07/25/2022 34.2  32.0 - 36.0 g/dL Final    RDW 07/25/2022 12.6  11.0 - 15.0 % Final    Platelets 07/25/2022 262  140 - 400 Thousand/uL Final    MPV 07/25/2022 10.4  7.5 - 12.5 fL Final    Neutrophils, Abs 07/25/2022 1,302 (A) 1,500 - 7,800 cells/uL Final    Lymph # 07/25/2022 2,050  850 - 3,900 cells/uL Final    Mono # 07/25/2022 270  200 - 950 cells/uL Final    Eos # 07/25/2022 59  15 - 500 cells/uL Final    Baso # 07/25/2022 19  0 - 200 cells/uL Final    Neutrophils Relative 07/25/2022 35.2  % Final    Lymph % 07/25/2022 55.4  % Final    Mono % 07/25/2022 7.3  % Final    Eosinophil % 07/25/2022 1.6  % Final    Basophil % 07/25/2022 0.5  % Final    Glucose 07/25/2022 84  65 - 99 mg/dL Final    BUN 07/25/2022 13  7 - 25 mg/dL Final    Creatinine 07/25/2022 0.66  0.60 - 1.24 mg/dL Final    EGFR 07/25/2022 138  > OR = 60 mL/min/1.73m2 Final    BUN/Creatinine Ratio 07/25/2022 NOT APPLICABLE  6 - 22 (calc) Final    Sodium 07/25/2022 138  135 - 146 mmol/L Final    Potassium 07/25/2022 3.8  3.5 - 5.3 mmol/L Final    Chloride 07/25/2022 100  98 - 110 mmol/L Final    CO2 07/25/2022 26  20 - 32 mmol/L Final    Calcium 07/25/2022 9.6  8.6 - 10.3 mg/dL Final    Total Protein 07/25/2022 7.8  6.1 - 8.1 g/dL Final    Albumin 07/25/2022 4.3  3.6 - 5.1 g/dL Final    Globulin, Total 07/25/2022 3.5  1.9 - 3.7 g/dL (calc) Final    Albumin/Globulin Ratio 07/25/2022 1.2  1.0 - 2.5 (calc) Final    Total Bilirubin 07/25/2022 0.6  0.2 - 1.2 mg/dL Final    Alkaline Phosphatase 07/25/2022 72  36 - 130 U/L Final    AST 07/25/2022 28  10 - 40 U/L Final    ALT 07/25/2022 34  9 - 46 U/L Final    TSH 07/25/2022 0.80  0.40 - 4.50 mIU/L Final    Cholesterol 07/25/2022 197  <200 mg/dL Final    HDL 07/25/2022 50  > OR = 40  mg/dL Final    Triglycerides 07/25/2022 76  <150 mg/dL Final    LDL Cholesterol 07/25/2022 130 (A) mg/dL (calc) Final    HDL/Cholesterol Ratio 07/25/2022 3.9  <5.0 (calc) Final    Non HDL Chol. (LDL+VLDL) 07/25/2022 147 (A) <130 mg/dL (calc) Final     Reviewed and discussed lab results in depth with full understanding.     Assessment:      1. Seasonal allergies Well controlled   2. Class 3 severe obesity due to excess calories without serious comorbidity with body mass index (BMI) of 40.0 to 44.9 in adult    3. Other hyperlipidemia    4. Pain and swelling of left ankle          Plan:     Seasonal allergies  Comments:  Continue zyrtec daily    Class 3 severe obesity due to excess calories without serious comorbidity with body mass index (BMI) of 40.0 to 44.9 in adult  Comments:  Eat a low cholesterol diet and exercise regularly    Other hyperlipidemia  Comments:  Eat a low cholesterol diet and exercise regularly    Pain and swelling of left ankle  Comments:  Referred to Ortho, Dr. Moreno for further evaluation  Orders:  -     Ambulatory referral/consult to Orthopedics; Future; Expected date: 08/17/2022         Current Outpatient Medications   Medication Sig Dispense Refill    carbamide peroxide (DEBROX) 6.5 % otic solution Place 5 drops into both ears 2 (two) times daily. (Patient not taking: Reported on 8/10/2022) 15 mL 0     No current facility-administered medications for this visit.       There are no discontinued medications.    Health Maintenance   Topic Date Due    Hepatitis C Screening  Never done    HPV Vaccines (1 - Male 2-dose series) Never done    TETANUS VACCINE  Never done    Lipid Panel  Completed       Patient Instructions   RTC in 3 months for F/U or sooner if needed.    Keep appts with specialists as scheduled.    Instructed patient to report to nearest ER or call 911 if begins to have difficulty breathing, turning blue, chest pain, B/P < 80/60 or >170/100, palpitations, syncope, extreme  "weakness, or severe H/A. Patient verbalized understanding.      Patient Education       Low Cholesterol, Saturated Fat, and Trans Fat Diet   About this topic   Cholesterol, saturated fat, and trans fat are in many foods. These may raise your blood cholesterol levels. If your cholesterol is too high, this can cause health problems in your heart, liver, kidneys, and even your eyes. The key to lowering your risk of heart problems is to lower your bad fat intake.  Saturated fats and trans fats are the bad fats. These fats clog your arteries and raise your bad cholesterol. Saturated fats and trans fats are solid fats at room temperature. Saturated fats are animal fats. Trans fats are manmade fats. They add flavor to a lot of packaged foods. Staying away from saturated and trans fats will help your heart.  When you do eat foods with fat, make sure they have the good fats. Monounsaturated and polyunsaturated fats are good fats. These fats help raise your good cholesterol and protect your heart.  General   How to Lower Fat and Cholesterol in Your Diet   · Read the labels of the foods you buy from the market to find out how much fat is present. Under 5% of total fat on a label means it is "low fat". Over 20% of total fat on a label means it is high fat.  · Eat high fiber foods, like soluble fiber. This type of fiber helps lower cholesterol in the body. Choose oatmeal, fruits (like apples), beans, and nuts to get the most soluble fiber.  · Eat foods high in omega-3 fatty acids like gurvinder seeds, walnuts, salmon, tuna, trout, herring, flaxseed, and soybeans. These foods help keep the heart healthy.  · Limit your bad fat and oil intake.  · Stay away from butter, stick margarine, shortening, lard, and palm and coconut oil. Pick plant-based spreads instead.  · Limit mayonnaise, salad dressings, gravies, and sauces, unless it is made from low-fat ingredients.  · Limit chocolate.  · Do not eat high-fat processed foods like hot " dogs, julian, sausage, ham and other luncheon meats high in fat, and some frozen foods. Pick fish, chicken, turkey, and lean meats instead.  · Eat more dried beans, lentils, and tofu to get your protein.  · Do not eat organ meats, like liver.  · Choose nonfat or low-fat milk, yogurt, and cheese.  · Use light or fat-free cream cheese and sour cream.  · Eat lots of fruits and vegetables.  · Pick whole grain breads, cereals, pastas, and rice.  · Do not eat snacks that are high in fats like granola, cookies, pies, pastries, doughnuts, and croissants.  · Stay away from deep fried foods.  Help When Cooking   · Remove the fat portion of meats and the skin from poultry before cooking.  · Bake, broil, grill, poach, or roast poultry, fish, and lean meats.  · Drain and throw away the fat that drains out of meat as you cook it.  · Try to add little or no fat to foods.  · Use olive or canola oil for cooking or baking.  · Steam your vegetables.  · Use herbs or no-oil marinades to flavor foods.         Who should use this diet?   This diet is for people who are at high risk of getting health problems like heart disease, high blood pressure, diabetes, and others. This diet is also good for all people to follow to keep your heart healthy.  What foods are good to eat?   Foods with good fats are:  · Canola, peanut, and olive oil  · Safflower, soybean, and corn oil  · Walnuts, almonds, cashews, and peanuts  · Pumpkin and sunflower seeds  · [place] and tuna  · Tofu  · Soymilk  · Avocado  What foods should be limited or avoided?   Stay away from these types of foods that have saturated fats:  · Whole fat dairy products like cheese, ice cream, whole milk, and cream  · Palm and coconut oils  · High-fat meats like beef, lamb, poultry with the skin, julian, and sausage  · Butter and lard  Stay away from these types of foods that may have trans fat:  · Cookies, cakes, candy, doughnuts, baked goods, muffins, pizza dough, and pie crusts that are  packaged  · Fried foods  · Frozen dinners  · Chips and crackers  · Microwave popcorn  · Stick margarine and vegetable shortenings  Helpful tips   To help stay away from saturated fat:  · Pick lean cuts of meat  · Take the skin off chicken and turkey or pick skinless  · Pick low-fat cheese, milk, and ice cream  · Use liquid oils when cooking and baking, such as olive oil and canola oil  To help stay away from trans fat:  · Look at your labels. Choose foods with 0% trans fat. Read the ingredient list. Avoid foods with partially hydrogenated oil in the ingredient list. This means there is trans fat in the product.  Where can I learn more?   American Heart Association   http://www.heart.org/HEARTORG/Conditions/Cholesterol/PreventionTreatmentofHighCholesterol/Know-Your-Fats_[facility]_305628_Article.jsp   Last Reviewed Date   2021-10-05  Consumer Information Use and Disclaimer   This information is not specific medical advice and does not replace information you receive from your health care provider. This is only a brief summary of general information. It does NOT include all information about conditions, illnesses, injuries, tests, procedures, treatments, therapies, discharge instructions or life-style choices that may apply to you. You must talk with your health care provider for complete information about your health and treatment options. This information should not be used to decide whether or not to accept your health care providers advice, instructions or recommendations. Only your health care provider has the knowledge and training to provide advice that is right for you.   Copyright   Copyright © 2021 [employer] and its affiliates and/or licensors. All rights reserved.  Patient Education       Seasonal Allergies Discharge Instructions   About this topic   Seasonal allergies are also called allergic rhinitis or hay fever. You may have sneezing; a stuffy or runny nose; or itchy throat, ears, or eyes. You may feel  very tired. Most often, this problem is caused by:  · Pollens from trees, grasses, or weeds.  · Mold spores that grow when the air is humid, wet, or damp.  Some people can breathe in these things and have no problems. But when you have a seasonal allergy, your body acts as if the substance is harming you. Then you have symptoms. You may have symptoms only at some times of the year. If your symptoms last all year, they may be caused by:  · Insects, such as dust mites or cock roaches.  · Animals, such as cats or dogs.  · Mold spores.     What care is needed at home?   1. Ask your doctor what you need to do when you go home. Make sure you ask questions if you do not understand what the doctor says.  2. Rinse your nose with salt water to get rid of pollen inside of your nose.  3. Keep the windows closed and use air conditioning.  4. Shower before bed to rinse pollen from your hair and skin.  5. Wear a dust mask if you need to be outside.  6. Use over-the-counter medicines to help with your symptoms:  ? A steroid nose spray can help with a stuffy nose. It can also help with drainage down the back of your throat.  ? An antihistamine can help with itching, sneezing, or runny nose.  ? An antihistamine eye drop can help with itchy eyes.  ? A decongestant can help with a stuffy nose. Talk with your doctor or pharmacist about your other health problems before you use this kind of medicine. It may not be safe for people with high blood pressure.  What follow-up care is needed?   Your doctor may ask you to make visits to the office to check on your progress. Your doctor may ask you to see an allergy specialist, or to have testing done to learn what is causing your allergies. Be sure to keep these visits.  What drugs may be needed?   The doctor may order drugs to treat the signs. Take your drugs as ordered. You may also take allergy shots if you have had allergy tests.  Will physical activity be limited?   You may have to limit  your activity. If your health problem is caused by an allergy to pollens or molds, you may want to limit your time outdoors. Talk to your doctor about what activities are best for you.  What problems could happen?   · Your signs may not get better with your drugs  · Asthma may get worse  · Sinus infection  What can be done to prevent this health problem?   Try to avoid allergens.  1. If you are allergic to pollens, grasses, trees, etc:  ? Stay inside in the morning when pollen counts are high.  ? Avoid going outside when the trees, grasses, or weeds are blooming.  ? Keep the windows of your house and car closed.  ? Use an air conditioner.  2. If you are allergic to dust, molds, dust mites, pets, etc:  ? Clean your air conditioner or furnace filters regularly.  ? Cover pillows and mattresses with vinyl covers to lower your exposure to dust mites.  ? Wash bedding weekly in very hot water.  ? Use fewer dust-collecting items, such as curtains, bed skirts, carpeting, and stuffed animals, mainly in your bedroom.  ? If you can't avoid having a furry pet, vacuum often and keep your pet out of bedrooms and other rooms with carpets.  When do I need to call the doctor?   · You are having so much trouble breathing that you can only say one or two words at a time.  · You need to sit upright at all times to be able to breathe and or cannot lie down.  · You have severe swelling of your tongue, lips, or mouth.  · You have trouble breathing when talking or sitting still.  · You have a fever of 100.4°F (38°C) or higher.  · You have green or yellow mucus.  Teach Back: Helping You Understand   The Teach Back Method helps you understand the information we are giving you. After you talk with the staff, tell them in your own words what you learned. This helps to make sure the staff has described each thing clearly. It also helps to explain things that may have been confusing. Before going home, make sure you can do these:  · I can tell  you about my condition.  · I can tell you what may help make the air .  · I can tell you what may help ease my allergies.  Where can I learn more?   Food and Drug Administration  https://www.fda.gov/ForConsumers/ConsumerUpdates/ubv140684.htm   NHS Choices  https://www.nhs.uk/conditions/Hay-fever/   Last Reviewed Date   2021-06-21  Consumer Information Use and Disclaimer   This information is not specific medical advice and does not replace information you receive from your health care provider. This is only a brief summary of general information. It does NOT include all information about conditions, illnesses, injuries, tests, procedures, treatments, therapies, discharge instructions or life-style choices that may apply to you. You must talk with your health care provider for complete information about your health and treatment options. This information should not be used to decide whether or not to accept your health care providers advice, instructions or recommendations. Only your health care provider has the knowledge and training to provide advice that is right for you.  Copyright   Copyright © 2021 [employer], [employer]. and its affiliates and/or licensors. All rights reserved.        Risks, benefits, and alternatives discussed with patient, Patient verbalized understanding of discussed plan of care. Asked patient if any further questions, answered no.    Future Appointments   Date Time Provider Department Center   11/2/2022  8:00 AM Rubina Galo NP [facility] Stephanie Galo NP

## 2022-08-10 NOTE — PATIENT INSTRUCTIONS
"RTC in 3 months for F/U or sooner if needed.    Keep appts with specialists as scheduled.    Instructed patient to report to nearest ER or call 911 if begins to have difficulty breathing, turning blue, chest pain, B/P < 80/60 or >170/100, palpitations, syncope, extreme weakness, or severe H/A. Patient verbalized understanding.      Patient Education       Low Cholesterol, Saturated Fat, and Trans Fat Diet   About this topic   Cholesterol, saturated fat, and trans fat are in many foods. These may raise your blood cholesterol levels. If your cholesterol is too high, this can cause health problems in your heart, liver, kidneys, and even your eyes. The key to lowering your risk of heart problems is to lower your bad fat intake.  Saturated fats and trans fats are the bad fats. These fats clog your arteries and raise your bad cholesterol. Saturated fats and trans fats are solid fats at room temperature. Saturated fats are animal fats. Trans fats are manmade fats. They add flavor to a lot of packaged foods. Staying away from saturated and trans fats will help your heart.  When you do eat foods with fat, make sure they have the good fats. Monounsaturated and polyunsaturated fats are good fats. These fats help raise your good cholesterol and protect your heart.  General   How to Lower Fat and Cholesterol in Your Diet   Read the labels of the foods you buy from the market to find out how much fat is present. Under 5% of total fat on a label means it is "low fat". Over 20% of total fat on a label means it is high fat.  Eat high fiber foods, like soluble fiber. This type of fiber helps lower cholesterol in the body. Choose oatmeal, fruits (like apples), beans, and nuts to get the most soluble fiber.  Eat foods high in omega-3 fatty acids like gurvinder seeds, walnuts, salmon, tuna, trout, herring, flaxseed, and soybeans. These foods help keep the heart healthy.  Limit your bad fat and oil intake.  Stay away from butter, stick " margarine, shortening, lard, and palm and coconut oil. Pick plant-based spreads instead.  Limit mayonnaise, salad dressings, gravies, and sauces, unless it is made from low-fat ingredients.  Limit chocolate.  Do not eat high-fat processed foods like hot dogs, julian, sausage, ham and other luncheon meats high in fat, and some frozen foods. Pick fish, chicken, turkey, and lean meats instead.  Eat more dried beans, lentils, and tofu to get your protein.  Do not eat organ meats, like liver.  Choose nonfat or low-fat milk, yogurt, and cheese.  Use light or fat-free cream cheese and sour cream.  Eat lots of fruits and vegetables.  Pick whole grain breads, cereals, pastas, and rice.  Do not eat snacks that are high in fats like granola, cookies, pies, pastries, doughnuts, and croissants.  Stay away from deep fried foods.  Help When Cooking   Remove the fat portion of meats and the skin from poultry before cooking.  Bake, broil, grill, poach, or roast poultry, fish, and lean meats.  Drain and throw away the fat that drains out of meat as you cook it.  Try to add little or no fat to foods.  Use olive or canola oil for cooking or baking.  Steam your vegetables.  Use herbs or no-oil marinades to flavor foods.         Who should use this diet?   This diet is for people who are at high risk of getting health problems like heart disease, high blood pressure, diabetes, and others. This diet is also good for all people to follow to keep your heart healthy.  What foods are good to eat?   Foods with good fats are:  Canola, peanut, and olive oil  Safflower, soybean, and corn oil  Walnuts, almonds, cashews, and peanuts  Pumpkin and sunflower seeds  [place] and tuna  Tofu  Soymilk  Avocado  What foods should be limited or avoided?   Stay away from these types of foods that have saturated fats:  Whole fat dairy products like cheese, ice cream, whole milk, and cream  Palm and coconut oils  High-fat meats like beef, lamb, poultry with the  skin, julian, and sausage  Butter and lard  Stay away from these types of foods that may have trans fat:  Cookies, cakes, candy, doughnuts, baked goods, muffins, pizza dough, and pie crusts that are packaged  Fried foods  Frozen dinners  Chips and crackers  Microwave popcorn  Stick margarine and vegetable shortenings  Helpful tips   To help stay away from saturated fat:  Pick lean cuts of meat  Take the skin off chicken and turkey or pick skinless  Pick low-fat cheese, milk, and ice cream  Use liquid oils when cooking and baking, such as olive oil and canola oil  To help stay away from trans fat:  Look at your labels. Choose foods with 0% trans fat. Read the ingredient list. Avoid foods with partially hydrogenated oil in the ingredient list. This means there is trans fat in the product.  Where can I learn more?   American Heart Association   http://www.heart.org/HEARTORG/Conditions/Cholesterol/PreventionTreatmentofHighCholesterol/Know-Your-Fats_[facility]_305628_Article.jsp   Last Reviewed Date   2021-10-05  Consumer Information Use and Disclaimer   This information is not specific medical advice and does not replace information you receive from your health care provider. This is only a brief summary of general information. It does NOT include all information about conditions, illnesses, injuries, tests, procedures, treatments, therapies, discharge instructions or life-style choices that may apply to you. You must talk with your health care provider for complete information about your health and treatment options. This information should not be used to decide whether or not to accept your health care providers advice, instructions or recommendations. Only your health care provider has the knowledge and training to provide advice that is right for you.   Copyright   Copyright © 2021 [employer] and its affiliates and/or licensors. All rights reserved.  Patient Education       Seasonal Allergies Discharge Instructions    About this topic   Seasonal allergies are also called allergic rhinitis or hay fever. You may have sneezing; a stuffy or runny nose; or itchy throat, ears, or eyes. You may feel very tired. Most often, this problem is caused by:  Pollens from trees, grasses, or weeds.  Mold spores that grow when the air is humid, wet, or damp.  Some people can breathe in these things and have no problems. But when you have a seasonal allergy, your body acts as if the substance is harming you. Then you have symptoms. You may have symptoms only at some times of the year. If your symptoms last all year, they may be caused by:  Insects, such as dust mites or cock roaches.  Animals, such as cats or dogs.  Mold spores.     What care is needed at home?   Ask your doctor what you need to do when you go home. Make sure you ask questions if you do not understand what the doctor says.  Rinse your nose with salt water to get rid of pollen inside of your nose.  Keep the windows closed and use air conditioning.  Shower before bed to rinse pollen from your hair and skin.  Wear a dust mask if you need to be outside.  Use over-the-counter medicines to help with your symptoms:  A steroid nose spray can help with a stuffy nose. It can also help with drainage down the back of your throat.  An antihistamine can help with itching, sneezing, or runny nose.  An antihistamine eye drop can help with itchy eyes.  A decongestant can help with a stuffy nose. Talk with your doctor or pharmacist about your other health problems before you use this kind of medicine. It may not be safe for people with high blood pressure.  What follow-up care is needed?   Your doctor may ask you to make visits to the office to check on your progress. Your doctor may ask you to see an allergy specialist, or to have testing done to learn what is causing your allergies. Be sure to keep these visits.  What drugs may be needed?   The doctor may order drugs to treat the signs. Take  your drugs as ordered. You may also take allergy shots if you have had allergy tests.  Will physical activity be limited?   You may have to limit your activity. If your health problem is caused by an allergy to pollens or molds, you may want to limit your time outdoors. Talk to your doctor about what activities are best for you.  What problems could happen?   Your signs may not get better with your drugs  Asthma may get worse  Sinus infection  What can be done to prevent this health problem?   Try to avoid allergens.  If you are allergic to pollens, grasses, trees, etc:  Stay inside in the morning when pollen counts are high.  Avoid going outside when the trees, grasses, or weeds are blooming.  Keep the windows of your house and car closed.  Use an air conditioner.  If you are allergic to dust, molds, dust mites, pets, etc:  Clean your air conditioner or furnace filters regularly.  Cover pillows and mattresses with vinyl covers to lower your exposure to dust mites.  Wash bedding weekly in very hot water.  Use fewer dust-collecting items, such as curtains, bed skirts, carpeting, and stuffed animals, mainly in your bedroom.  If you can't avoid having a furry pet, vacuum often and keep your pet out of bedrooms and other rooms with carpets.  When do I need to call the doctor?   You are having so much trouble breathing that you can only say one or two words at a time.  You need to sit upright at all times to be able to breathe and or cannot lie down.  You have severe swelling of your tongue, lips, or mouth.  You have trouble breathing when talking or sitting still.  You have a fever of 100.4°F (38°C) or higher.  You have green or yellow mucus.  Teach Back: Helping You Understand   The Teach Back Method helps you understand the information we are giving you. After you talk with the staff, tell them in your own words what you learned. This helps to make sure the staff has described each thing clearly. It also helps to  explain things that may have been confusing. Before going home, make sure you can do these:  I can tell you about my condition.  I can tell you what may help make the air .  I can tell you what may help ease my allergies.  Where can I learn more?   Food and Drug Administration  https://www.fda.gov/ForConsumers/ConsumerUpdates/agl452415.htm   NHS Choices  https://www.nhs.uk/conditions/Hay-fever/   Last Reviewed Date   2021-06-21  Consumer Information Use and Disclaimer   This information is not specific medical advice and does not replace information you receive from your health care provider. This is only a brief summary of general information. It does NOT include all information about conditions, illnesses, injuries, tests, procedures, treatments, therapies, discharge instructions or life-style choices that may apply to you. You must talk with your health care provider for complete information about your health and treatment options. This information should not be used to decide whether or not to accept your health care providers advice, instructions or recommendations. Only your health care provider has the knowledge and training to provide advice that is right for you.  Copyright   Copyright © 2021 [employer], [employer]. and its affiliates and/or licensors. All rights reserved.

## 2022-08-26 ENCOUNTER — TELEPHONE (OUTPATIENT)
Dept: PRIMARY CARE CLINIC | Facility: CLINIC | Age: 22
End: 2022-08-26
Payer: MEDICAID

## 2022-08-26 DIAGNOSIS — J06.9 ACUTE UPPER RESPIRATORY INFECTION: Primary | ICD-10-CM

## 2022-08-26 RX ORDER — AZITHROMYCIN 250 MG/1
TABLET, FILM COATED ORAL
Qty: 6 TABLET | Refills: 0 | Status: SHIPPED | OUTPATIENT
Start: 2022-08-26 | End: 2022-08-31

## 2022-08-26 NOTE — TELEPHONE ENCOUNTER
----- Message from Salome Navas MA sent at 8/26/2022  9:10 AM CDT -----  Contact: shayna (Mother)    ----- Message -----  From: Kristy Goodman  Sent: 8/26/2022   9:01 AM CDT  To: Iraj WORLEY Staff    Requesting a call back regarding swollen tonsils and mucous - would like to have something called out (no coughing or fever). Please call back at [phone]     Preferred pharmacy:  [facility] [address] [place] [address] - [address] [facility]  [address] [facility] [address]  Phone: [phone] Fax: [phone]

## 2022-09-08 ENCOUNTER — OFFICE VISIT (OUTPATIENT)
Dept: ORTHOPEDICS | Facility: CLINIC | Age: 22
End: 2022-09-08
Payer: MEDICAID

## 2022-09-08 DIAGNOSIS — M65.9 SYNOVITIS OF LEFT ANKLE: ICD-10-CM

## 2022-09-08 DIAGNOSIS — M77.52 TENDINITIS OF LEFT ANKLE: Primary | ICD-10-CM

## 2022-09-08 PROCEDURE — 1159F MED LIST DOCD IN RCRD: CPT | Mod: CPTII,S$GLB,, | Performed by: ORTHOPAEDIC SURGERY

## 2022-09-08 PROCEDURE — 99202 PR OFFICE/OUTPT VISIT, NEW, LEVL II, 15-29 MIN: ICD-10-PCS | Mod: S$GLB,,, | Performed by: ORTHOPAEDIC SURGERY

## 2022-09-08 PROCEDURE — 1159F PR MEDICATION LIST DOCUMENTED IN MEDICAL RECORD: ICD-10-PCS | Mod: CPTII,S$GLB,, | Performed by: ORTHOPAEDIC SURGERY

## 2022-09-08 PROCEDURE — 99202 OFFICE O/P NEW SF 15 MIN: CPT | Mod: S$GLB,,, | Performed by: ORTHOPAEDIC SURGERY

## 2022-09-08 PROCEDURE — 1160F RVW MEDS BY RX/DR IN RCRD: CPT | Mod: CPTII,S$GLB,, | Performed by: ORTHOPAEDIC SURGERY

## 2022-09-08 PROCEDURE — 1160F PR REVIEW ALL MEDS BY PRESCRIBER/CLIN PHARMACIST DOCUMENTED: ICD-10-PCS | Mod: CPTII,S$GLB,, | Performed by: ORTHOPAEDIC SURGERY

## 2022-09-08 RX ORDER — MELOXICAM 15 MG/1
15 TABLET ORAL DAILY
Qty: 90 TABLET | Refills: 3 | Status: SHIPPED | OUTPATIENT
Start: 2022-09-08 | End: 2023-05-05 | Stop reason: SDUPTHER

## 2022-09-08 NOTE — PROGRESS NOTES
Subjective:      Patient ID: Jose Antonio Jeter is a 22 y.o. male.    Chief Complaint: Pain of the Left Ankle    HPI 22-year-old with an 8 month history of left ankle pain.  He takes Motrin intermittently.  He denies any history of trauma.  He has outside x-rays which are unremarkable.    Review of Systems   Constitutional: Negative for fever and weight loss.   Cardiovascular:  Negative for chest pain and leg swelling.   Musculoskeletal:  Positive for joint pain and stiffness. Negative for arthritis, joint swelling and muscle weakness.   Gastrointestinal:  Negative for change in bowel habit.   Genitourinary:  Negative for bladder incontinence and hematuria.   Neurological:  Negative for focal weakness, numbness, paresthesias and sensory change.       Objective:      Examination of the left foot and ankle shows pes planus.  He has mild swelling diffusely.  He is tender over the anterior capsule and posterior tibial tendon.  He has no pathologic instability.  He has normal sensation and capillary refill.      Ortho/SPM Exam            Assessment:       Encounter Diagnoses   Name Primary?    Tendinitis of left ankle Yes    Synovitis of left ankle           Plan:       Jose Antonio was seen today for pain.    Diagnoses and all orders for this visit:    Tendinitis of left ankle    Synovitis of left ankle    Impression is transient synovitis of the ankle.  He is placed on meloxicam return 3 weeks for recheck

## 2022-09-29 ENCOUNTER — OFFICE VISIT (OUTPATIENT)
Dept: ORTHOPEDICS | Facility: CLINIC | Age: 22
End: 2022-09-29
Payer: MEDICAID

## 2022-09-29 DIAGNOSIS — M77.52 TENDINITIS OF LEFT ANKLE: Primary | ICD-10-CM

## 2022-09-29 PROCEDURE — 99212 PR OFFICE/OUTPT VISIT, EST, LEVL II, 10-19 MIN: ICD-10-PCS | Mod: S$GLB,,, | Performed by: ORTHOPAEDIC SURGERY

## 2022-09-29 PROCEDURE — 99212 OFFICE O/P EST SF 10 MIN: CPT | Mod: S$GLB,,, | Performed by: ORTHOPAEDIC SURGERY

## 2022-09-29 PROCEDURE — 1159F MED LIST DOCD IN RCRD: CPT | Mod: CPTII,S$GLB,, | Performed by: ORTHOPAEDIC SURGERY

## 2022-09-29 PROCEDURE — 1160F PR REVIEW ALL MEDS BY PRESCRIBER/CLIN PHARMACIST DOCUMENTED: ICD-10-PCS | Mod: CPTII,S$GLB,, | Performed by: ORTHOPAEDIC SURGERY

## 2022-09-29 PROCEDURE — 1160F RVW MEDS BY RX/DR IN RCRD: CPT | Mod: CPTII,S$GLB,, | Performed by: ORTHOPAEDIC SURGERY

## 2022-09-29 PROCEDURE — 1159F PR MEDICATION LIST DOCUMENTED IN MEDICAL RECORD: ICD-10-PCS | Mod: CPTII,S$GLB,, | Performed by: ORTHOPAEDIC SURGERY

## 2022-09-29 NOTE — PROGRESS NOTES
Subjective:      Patient ID: Jose Antonio Jeter is a 22 y.o. male.    Chief Complaint: Pain of the Left Ankle    HPI patient comes in today for routine follow-up will for tendinitis of his ankle.  He states he is better on meloxicam.    ROS unchanged from prior visit      Objective:      Active and passive range of motion of the ankle is normal.  He has mild swelling.  He has minimal tenderness over the anterior capsule.      Ortho/SPM Exam            Assessment:       Encounter Diagnosis   Name Primary?    Tendinitis of left ankle Yes          Plan:       Jose Antonio was seen today for pain.    Diagnoses and all orders for this visit:    Tendinitis of left ankle      He is to continue with the meloxicam p.r.n. return as needed

## 2022-11-02 ENCOUNTER — OFFICE VISIT (OUTPATIENT)
Dept: PRIMARY CARE CLINIC | Facility: CLINIC | Age: 22
End: 2022-11-02
Payer: MEDICAID

## 2022-11-02 VITALS
OXYGEN SATURATION: 99 % | HEIGHT: 69 IN | SYSTOLIC BLOOD PRESSURE: 130 MMHG | HEART RATE: 102 BPM | RESPIRATION RATE: 18 BRPM | WEIGHT: 283.81 LBS | DIASTOLIC BLOOD PRESSURE: 80 MMHG | BODY MASS INDEX: 42.03 KG/M2

## 2022-11-02 DIAGNOSIS — E66.01 CLASS 3 SEVERE OBESITY DUE TO EXCESS CALORIES WITHOUT SERIOUS COMORBIDITY WITH BODY MASS INDEX (BMI) OF 40.0 TO 44.9 IN ADULT: ICD-10-CM

## 2022-11-02 DIAGNOSIS — M77.52 LEFT ANKLE TENDINITIS: ICD-10-CM

## 2022-11-02 DIAGNOSIS — E78.49 OTHER HYPERLIPIDEMIA: ICD-10-CM

## 2022-11-02 PROCEDURE — 99214 OFFICE O/P EST MOD 30 MIN: CPT | Mod: S$GLB,,, | Performed by: NURSE PRACTITIONER

## 2022-11-02 PROCEDURE — 1160F PR REVIEW ALL MEDS BY PRESCRIBER/CLIN PHARMACIST DOCUMENTED: ICD-10-PCS | Mod: CPTII,S$GLB,, | Performed by: NURSE PRACTITIONER

## 2022-11-02 PROCEDURE — 1160F RVW MEDS BY RX/DR IN RCRD: CPT | Mod: CPTII,S$GLB,, | Performed by: NURSE PRACTITIONER

## 2022-11-02 PROCEDURE — 3075F SYST BP GE 130 - 139MM HG: CPT | Mod: CPTII,S$GLB,, | Performed by: NURSE PRACTITIONER

## 2022-11-02 PROCEDURE — 3079F DIAST BP 80-89 MM HG: CPT | Mod: CPTII,S$GLB,, | Performed by: NURSE PRACTITIONER

## 2022-11-02 PROCEDURE — 1159F MED LIST DOCD IN RCRD: CPT | Mod: CPTII,S$GLB,, | Performed by: NURSE PRACTITIONER

## 2022-11-02 PROCEDURE — 3008F PR BODY MASS INDEX (BMI) DOCUMENTED: ICD-10-PCS | Mod: CPTII,S$GLB,, | Performed by: NURSE PRACTITIONER

## 2022-11-02 PROCEDURE — 99214 PR OFFICE/OUTPT VISIT, EST, LEVL IV, 30-39 MIN: ICD-10-PCS | Mod: S$GLB,,, | Performed by: NURSE PRACTITIONER

## 2022-11-02 PROCEDURE — 3075F PR MOST RECENT SYSTOLIC BLOOD PRESS GE 130-139MM HG: ICD-10-PCS | Mod: CPTII,S$GLB,, | Performed by: NURSE PRACTITIONER

## 2022-11-02 PROCEDURE — 3008F BODY MASS INDEX DOCD: CPT | Mod: CPTII,S$GLB,, | Performed by: NURSE PRACTITIONER

## 2022-11-02 PROCEDURE — 1159F PR MEDICATION LIST DOCUMENTED IN MEDICAL RECORD: ICD-10-PCS | Mod: CPTII,S$GLB,, | Performed by: NURSE PRACTITIONER

## 2022-11-02 PROCEDURE — 3079F PR MOST RECENT DIASTOLIC BLOOD PRESSURE 80-89 MM HG: ICD-10-PCS | Mod: CPTII,S$GLB,, | Performed by: NURSE PRACTITIONER

## 2022-11-02 NOTE — PROGRESS NOTES
Subjective:       Patient ID: Jose Antonio Jeter is a 22 y.o. male.    Chief Complaint: Follow-up    HPI: Jose Antonio is a 22 y.o. male who presents for F/U.     Feels well with no new issues or concerns today.    Cholesterol slightly elevated on recent labs. Admits to not eating healthy but will cut back on eating fried food and fast food. All other labs are normal.    History of left outer ankle pain and swelling after hurting it at work months ago. Ankle XRAY done and was negative. Referred to Dr. Moreno and he was evaluated and found to have tendinitis of his foot, treated with meloxicam which works well.    History of seasonal allergies which are controlled with zyrtec.    UTD with COVID vaccine.             History reviewed. No pertinent past medical history.    History reviewed. No pertinent surgical history.    History reviewed. No pertinent family history.    Social History     Tobacco Use    Smoking status: Never    Smokeless tobacco: Never   Substance Use Topics    Alcohol use: Never       Patient Active Problem List   Diagnosis    Seasonal allergies    Class 3 severe obesity due to excess calories without serious comorbidity with body mass index (BMI) of 40.0 to 44.9 in adult    Other hyperlipidemia       Immunization History   Administered Date(s) Administered    COVID-19, MRNA, LN-S, PF (Pfizer) (Purple Cap) 08/01/2021, 08/31/2021           Review of Systems   Constitutional:  Negative for activity change, appetite change, chills, diaphoresis, fatigue and fever.   HENT:  Negative for tinnitus and trouble swallowing.    Eyes:  Negative for visual disturbance.   Respiratory:  Negative for cough, chest tightness, shortness of breath and wheezing.    Cardiovascular:  Negative for chest pain, palpitations and leg swelling.   Gastrointestinal:  Negative for abdominal distention and abdominal pain.   Genitourinary:  Negative for decreased urine volume, dysuria, frequency, hematuria and urgency.   Musculoskeletal:   "Positive for arthralgias (left ankle). Negative for back pain and gait problem.   Skin:  Negative for color change and rash.   Neurological:  Negative for dizziness, speech difficulty, weakness, light-headedness, numbness and headaches.   Psychiatric/Behavioral:  Negative for behavioral problems, confusion and dysphoric mood. The patient is not nervous/anxious.    Objective:     Vitals:    11/02/22 0815   BP: 130/80   BP Location: Right arm   Patient Position: Sitting   BP Method: Large (Automatic)   Pulse: 102   Resp: 18   SpO2: 99%   Weight: 128.7 kg (283 lb 12.8 oz)   Height: 5' 9" (1.753 m)       Physical Exam  Vitals and nursing note reviewed.   Constitutional:       Appearance: Normal appearance.   HENT:      Head: Normocephalic and atraumatic.      Mouth/Throat:      Mouth: Mucous membranes are moist.      Pharynx: Oropharynx is clear.   Eyes:      Conjunctiva/sclera: Conjunctivae normal.      Pupils: Pupils are equal, round, and reactive to light.   Cardiovascular:      Rate and Rhythm: Normal rate and regular rhythm.   Pulmonary:      Effort: Pulmonary effort is normal.      Breath sounds: Normal breath sounds. No wheezing or rales.   Abdominal:      General: There is no distension.      Tenderness: There is no abdominal tenderness.   Musculoskeletal:         General: Normal range of motion.      Cervical back: Normal range of motion.      Right lower leg: No edema.      Left lower leg: No edema.   Skin:     General: Skin is warm and dry.      Capillary Refill: Capillary refill takes less than 2 seconds.   Neurological:      Mental Status: He is alert and oriented to person, place, and time.   Psychiatric:         Mood and Affect: Mood and affect normal.         Behavior: Behavior normal. Behavior is cooperative.       Office Visit on 05/10/2022   Component Date Value Ref Range Status    WBC 07/25/2022 3.7 (L)  3.8 - 10.8 Thousand/uL Final    RBC 07/25/2022 4.84  4.20 - 5.80 Million/uL Final    Hemoglobin " 07/25/2022 14.7  13.2 - 17.1 g/dL Final    Hematocrit 07/25/2022 43.0  38.5 - 50.0 % Final    MCV 07/25/2022 88.8  80.0 - 100.0 fL Final    MCH 07/25/2022 30.4  27.0 - 33.0 pg Final    MCHC 07/25/2022 34.2  32.0 - 36.0 g/dL Final    RDW 07/25/2022 12.6  11.0 - 15.0 % Final    Platelets 07/25/2022 262  140 - 400 Thousand/uL Final    MPV 07/25/2022 10.4  7.5 - 12.5 fL Final    Neutrophils, Abs 07/25/2022 1,302 (L)  1,500 - 7,800 cells/uL Final    Lymph # 07/25/2022 2,050  850 - 3,900 cells/uL Final    Mono # 07/25/2022 270  200 - 950 cells/uL Final    Eos # 07/25/2022 59  15 - 500 cells/uL Final    Baso # 07/25/2022 19  0 - 200 cells/uL Final    Neutrophils Relative 07/25/2022 35.2  % Final    Lymph % 07/25/2022 55.4  % Final    Mono % 07/25/2022 7.3  % Final    Eosinophil % 07/25/2022 1.6  % Final    Basophil % 07/25/2022 0.5  % Final    Glucose 07/25/2022 84  65 - 99 mg/dL Final    BUN 07/25/2022 13  7 - 25 mg/dL Final    Creatinine 07/25/2022 0.66  0.60 - 1.24 mg/dL Final    eGFR 07/25/2022 138  > OR = 60 mL/min/1.73m2 Final    BUN/Creatinine Ratio 07/25/2022 NOT APPLICABLE  6 - 22 (calc) Final    Sodium 07/25/2022 138  135 - 146 mmol/L Final    Potassium 07/25/2022 3.8  3.5 - 5.3 mmol/L Final    Chloride 07/25/2022 100  98 - 110 mmol/L Final    CO2 07/25/2022 26  20 - 32 mmol/L Final    Calcium 07/25/2022 9.6  8.6 - 10.3 mg/dL Final    Total Protein 07/25/2022 7.8  6.1 - 8.1 g/dL Final    Albumin 07/25/2022 4.3  3.6 - 5.1 g/dL Final    Globulin, Total 07/25/2022 3.5  1.9 - 3.7 g/dL (calc) Final    Albumin/Globulin Ratio 07/25/2022 1.2  1.0 - 2.5 (calc) Final    Total Bilirubin 07/25/2022 0.6  0.2 - 1.2 mg/dL Final    Alkaline Phosphatase 07/25/2022 72  36 - 130 U/L Final    AST 07/25/2022 28  10 - 40 U/L Final    ALT 07/25/2022 34  9 - 46 U/L Final    TSH 07/25/2022 0.80  0.40 - 4.50 mIU/L Final    Cholesterol 07/25/2022 197  <200 mg/dL Final    HDL 07/25/2022 50  > OR = 40 mg/dL Final    Triglycerides 07/25/2022  76  <150 mg/dL Final    LDL Cholesterol 07/25/2022 130 (H)  mg/dL (calc) Final    HDL/Cholesterol Ratio 07/25/2022 3.9  <5.0 (calc) Final    Non HDL Chol. (LDL+VLDL) 07/25/2022 147 (H)  <130 mg/dL (calc) Final         Assessment:      1. Class 3 severe obesity due to excess calories without serious comorbidity with body mass index (BMI) of 40.0 to 44.9 in adult    2. Other hyperlipidemia    3. Left ankle tendinitis          Plan:     Class 3 severe obesity due to excess calories without serious comorbidity with body mass index (BMI) of 40.0 to 44.9 in adult  Comments:  Eat low cholesterol diet and exercise regularly    Other hyperlipidemia  Comments:  Eat low cholesterol diet and exercise regularly    Left ankle tendinitis  Comments:  Continue meloxicam       Current Outpatient Medications   Medication Sig Dispense Refill    meloxicam (MOBIC) 15 MG tablet Take 1 tablet (15 mg total) by mouth once daily. 90 tablet 3     No current facility-administered medications for this visit.       There are no discontinued medications.    Health Maintenance   Topic Date Due    Hepatitis C Screening  Never done    HPV Vaccines (1 - Male 2-dose series) Never done    TETANUS VACCINE  Never done    Lipid Panel  Completed       Patient Instructions   RTC in 6 months for F/U or sooner if needed.    Keep appts with specialists as scheduled.    Instructed patient to report to nearest ER or call 911 if begins to have difficulty breathing, turning blue, chest pain, B/P < 80/60 or >170/100, palpitations, syncope, extreme weakness, or severe H/A. Patient verbalized understanding.      Patient Education       Seasonal Allergies Discharge Instructions   About this topic   Seasonal allergies are also called allergic rhinitis or hay fever. You may have sneezing; a stuffy or runny nose; or itchy throat, ears, or eyes. You may feel very tired. Most often, this problem is caused by:  Pollens from trees, grasses, or weeds.  Mold spores that grow  when the air is humid, wet, or damp.  Some people can breathe in these things and have no problems. But when you have a seasonal allergy, your body acts as if the substance is harming you. Then you have symptoms. You may have symptoms only at some times of the year. If your symptoms last all year, they may be caused by:  Insects, such as dust mites or cock roaches.  Animals, such as cats or dogs.  Mold spores.     What care is needed at home?   Ask your doctor what you need to do when you go home. Make sure you ask questions if you do not understand what the doctor says.  Rinse your nose with salt water to get rid of pollen inside of your nose.  Keep the windows closed and use air conditioning.  Shower before bed to rinse pollen from your hair and skin.  Wear a dust mask if you need to be outside.  Use over-the-counter medicines to help with your symptoms:  A steroid nose spray can help with a stuffy nose. It can also help with drainage down the back of your throat.  An antihistamine can help with itching, sneezing, or runny nose.  An antihistamine eye drop can help with itchy eyes.  A decongestant can help with a stuffy nose. Talk with your doctor or pharmacist about your other health problems before you use this kind of medicine. It may not be safe for people with high blood pressure.  What follow-up care is needed?   Your doctor may ask you to make visits to the office to check on your progress. Your doctor may ask you to see an allergy specialist, or to have testing done to learn what is causing your allergies. Be sure to keep these visits.  What drugs may be needed?   The doctor may order drugs to treat the signs. Take your drugs as ordered. You may also take allergy shots if you have had allergy tests.  Will physical activity be limited?   You may have to limit your activity. If your health problem is caused by an allergy to pollens or molds, you may want to limit your time outdoors. Talk to your doctor about  what activities are best for you.  What problems could happen?   Your signs may not get better with your drugs  Asthma may get worse  Sinus infection  What can be done to prevent this health problem?   Try to avoid allergens.  If you are allergic to pollens, grasses, trees, etc:  Stay inside in the morning when pollen counts are high.  Avoid going outside when the trees, grasses, or weeds are blooming.  Keep the windows of your house and car closed.  Use an air conditioner.  If you are allergic to dust, molds, dust mites, pets, etc:  Clean your air conditioner or furnace filters regularly.  Cover pillows and mattresses with vinyl covers to lower your exposure to dust mites.  Wash bedding weekly in very hot water.  Use fewer dust-collecting items, such as curtains, bed skirts, carpeting, and stuffed animals, mainly in your bedroom.  If you can't avoid having a furry pet, vacuum often and keep your pet out of bedrooms and other rooms with carpets.  When do I need to call the doctor?   You are having so much trouble breathing that you can only say one or two words at a time.  You need to sit upright at all times to be able to breathe and or cannot lie down.  You have severe swelling of your tongue, lips, or mouth.  You have trouble breathing when talking or sitting still.  You have a fever of 100.4°F (38°C) or higher.  You have green or yellow mucus.  Teach Back: Helping You Understand   The Teach Back Method helps you understand the information we are giving you. After you talk with the staff, tell them in your own words what you learned. This helps to make sure the staff has described each thing clearly. It also helps to explain things that may have been confusing. Before going home, make sure you can do these:  I can tell you about my condition.  I can tell you what may help make the air .  I can tell you what may help ease my allergies.  Where can I learn more?   Food and Drug  Administration  https://www.fda.gov/ForConsumers/ConsumerUpdates/wvt147896.htm   NHS Choices  https://www.nhs.uk/conditions/Hay-fever/   Last Reviewed Date   2021-06-21  Consumer Information Use and Disclaimer   This information is not specific medical advice and does not replace information you receive from your health care provider. This is only a brief summary of general information. It does NOT include all information about conditions, illnesses, injuries, tests, procedures, treatments, therapies, discharge instructions or life-style choices that may apply to you. You must talk with your health care provider for complete information about your health and treatment options. This information should not be used to decide whether or not to accept your health care providers advice, instructions or recommendations. Only your health care provider has the knowledge and training to provide advice that is right for you.  Copyright   Copyright © 2021 [employer] and its affiliates and/or licensors. All rights reserved.    Patient Education       Low Cholesterol, Saturated Fat, and Trans Fat Diet   About this topic   Cholesterol, saturated fat, and trans fat are in many foods. These may raise your blood cholesterol levels. If your cholesterol is too high, this can cause health problems in your heart, liver, kidneys, and even your eyes. The key to lowering your risk of heart problems is to lower your bad fat intake.  Saturated fats and trans fats are the bad fats. These fats clog your arteries and raise your bad cholesterol. Saturated fats and trans fats are solid fats at room temperature. Saturated fats are animal fats. Trans fats are manmade fats. They add flavor to a lot of packaged foods. Staying away from saturated and trans fats will help your heart.  When you do eat foods with fat, make sure they have the good fats. Monounsaturated and polyunsaturated fats are good fats. These fats help raise your good cholesterol  "and protect your heart.  General   How to Lower Fat and Cholesterol in Your Diet   Read the labels of the foods you buy from the market to find out how much fat is present. Under 5% of total fat on a label means it is "low fat". Over 20% of total fat on a label means it is high fat.  Eat high fiber foods, like soluble fiber. This type of fiber helps lower cholesterol in the body. Choose oatmeal, fruits (like apples), beans, and nuts to get the most soluble fiber.  Eat foods high in omega-3 fatty acids like gurvinder seeds, walnuts, salmon, tuna, trout, herring, flaxseed, and soybeans. These foods help keep the heart healthy.  Limit your bad fat and oil intake.  Stay away from butter, stick margarine, shortening, lard, and palm and coconut oil. Pick plant-based spreads instead.  Limit mayonnaise, salad dressings, gravies, and sauces, unless it is made from low-fat ingredients.  Limit chocolate.  Do not eat high-fat processed foods like hot dogs, julian, sausage, ham and other luncheon meats high in fat, and some frozen foods. Pick fish, chicken, turkey, and lean meats instead.  Eat more dried beans, lentils, and tofu to get your protein.  Do not eat organ meats, like liver.  Choose nonfat or low-fat milk, yogurt, and cheese.  Use light or fat-free cream cheese and sour cream.  Eat lots of fruits and vegetables.  Pick whole grain breads, cereals, pastas, and rice.  Do not eat snacks that are high in fats like granola, cookies, pies, pastries, doughnuts, and croissants.  Stay away from deep fried foods.  Help When Cooking   Remove the fat portion of meats and the skin from poultry before cooking.  Bake, broil, grill, poach, or roast poultry, fish, and lean meats.  Drain and throw away the fat that drains out of meat as you cook it.  Try to add little or no fat to foods.  Use olive or canola oil for cooking or baking.  Steam your vegetables.  Use herbs or no-oil marinades to flavor foods.         Who should use this diet? "   This diet is for people who are at high risk of getting health problems like heart disease, high blood pressure, diabetes, and others. This diet is also good for all people to follow to keep your heart healthy.  What foods are good to eat?   Foods with good fats are:  Canola, peanut, and olive oil  Safflower, soybean, and corn oil  Walnuts, almonds, cashews, and peanuts  Pumpkin and sunflower seeds  [place] and tuna  Tofu  Soymilk  Avocado  What foods should be limited or avoided?   Stay away from these types of foods that have saturated fats:  Whole fat dairy products like cheese, ice cream, whole milk, and cream  Palm and coconut oils  High-fat meats like beef, lamb, poultry with the skin, julian, and sausage  Butter and lard  Stay away from these types of foods that may have trans fat:  Cookies, cakes, candy, doughnuts, baked goods, muffins, pizza dough, and pie crusts that are packaged  Fried foods  Frozen dinners  Chips and crackers  Microwave popcorn  Stick margarine and vegetable shortenings  Helpful tips   To help stay away from saturated fat:  Pick lean cuts of meat  Take the skin off chicken and turkey or pick skinless  Pick low-fat cheese, milk, and ice cream  Use liquid oils when cooking and baking, such as olive oil and canola oil  To help stay away from trans fat:  Look at your labels. Choose foods with 0% trans fat. Read the ingredient list. Avoid foods with partially hydrogenated oil in the ingredient list. This means there is trans fat in the product.  Where can I learn more?   American Heart Association   http://www.heart.org/HEARTORG/Conditions/Cholesterol/PreventionTreatmentofHighCholesterol/Know-Your-Fats_[facility]_305628_Article.jsp   Last Reviewed Date   2021-10-05  Consumer Information Use and Disclaimer   This information is not specific medical advice and does not replace information you receive from your health care provider. This is only a brief summary of general information. It does NOT  include all information about conditions, illnesses, injuries, tests, procedures, treatments, therapies, discharge instructions or life-style choices that may apply to you. You must talk with your health care provider for complete information about your health and treatment options. This information should not be used to decide whether or not to accept your health care providers advice, instructions or recommendations. Only your health care provider has the knowledge and training to provide advice that is right for you.   Copyright   Copyright © 2021 [employer], [employer]. and its affiliates and/or licensors. All rights reserved.      Risks, benefits, and alternatives discussed with patient, Patient verbalized understanding of discussed plan of care. Asked patient if any further questions, answered no.    Future Appointments   Date Time Provider Department [place]   5/5/2023  8:00 AM [facility]

## 2022-11-02 NOTE — PATIENT INSTRUCTIONS
RTC in 6 months for F/U or sooner if needed.    Keep appts with specialists as scheduled.    Instructed patient to report to nearest ER or call 911 if begins to have difficulty breathing, turning blue, chest pain, B/P < 80/60 or >170/100, palpitations, syncope, extreme weakness, or severe H/A. Patient verbalized understanding.      Patient Education       Seasonal Allergies Discharge Instructions   About this topic   Seasonal allergies are also called allergic rhinitis or hay fever. You may have sneezing; a stuffy or runny nose; or itchy throat, ears, or eyes. You may feel very tired. Most often, this problem is caused by:  Pollens from trees, grasses, or weeds.  Mold spores that grow when the air is humid, wet, or damp.  Some people can breathe in these things and have no problems. But when you have a seasonal allergy, your body acts as if the substance is harming you. Then you have symptoms. You may have symptoms only at some times of the year. If your symptoms last all year, they may be caused by:  Insects, such as dust mites or cock roaches.  Animals, such as cats or dogs.  Mold spores.     What care is needed at home?   Ask your doctor what you need to do when you go home. Make sure you ask questions if you do not understand what the doctor says.  Rinse your nose with salt water to get rid of pollen inside of your nose.  Keep the windows closed and use air conditioning.  Shower before bed to rinse pollen from your hair and skin.  Wear a dust mask if you need to be outside.  Use over-the-counter medicines to help with your symptoms:  A steroid nose spray can help with a stuffy nose. It can also help with drainage down the back of your throat.  An antihistamine can help with itching, sneezing, or runny nose.  An antihistamine eye drop can help with itchy eyes.  A decongestant can help with a stuffy nose. Talk with your doctor or pharmacist about your other health problems before you use this kind of medicine. It  may not be safe for people with high blood pressure.  What follow-up care is needed?   Your doctor may ask you to make visits to the office to check on your progress. Your doctor may ask you to see an allergy specialist, or to have testing done to learn what is causing your allergies. Be sure to keep these visits.  What drugs may be needed?   The doctor may order drugs to treat the signs. Take your drugs as ordered. You may also take allergy shots if you have had allergy tests.  Will physical activity be limited?   You may have to limit your activity. If your health problem is caused by an allergy to pollens or molds, you may want to limit your time outdoors. Talk to your doctor about what activities are best for you.  What problems could happen?   Your signs may not get better with your drugs  Asthma may get worse  Sinus infection  What can be done to prevent this health problem?   Try to avoid allergens.  If you are allergic to pollens, grasses, trees, etc:  Stay inside in the morning when pollen counts are high.  Avoid going outside when the trees, grasses, or weeds are blooming.  Keep the windows of your house and car closed.  Use an air conditioner.  If you are allergic to dust, molds, dust mites, pets, etc:  Clean your air conditioner or furnace filters regularly.  Cover pillows and mattresses with vinyl covers to lower your exposure to dust mites.  Wash bedding weekly in very hot water.  Use fewer dust-collecting items, such as curtains, bed skirts, carpeting, and stuffed animals, mainly in your bedroom.  If you can't avoid having a furry pet, vacuum often and keep your pet out of bedrooms and other rooms with carpets.  When do I need to call the doctor?   You are having so much trouble breathing that you can only say one or two words at a time.  You need to sit upright at all times to be able to breathe and or cannot lie down.  You have severe swelling of your tongue, lips, or mouth.  You have trouble  breathing when talking or sitting still.  You have a fever of 100.4°F (38°C) or higher.  You have green or yellow mucus.  Teach Back: Helping You Understand   The Teach Back Method helps you understand the information we are giving you. After you talk with the staff, tell them in your own words what you learned. This helps to make sure the staff has described each thing clearly. It also helps to explain things that may have been confusing. Before going home, make sure you can do these:  I can tell you about my condition.  I can tell you what may help make the air .  I can tell you what may help ease my allergies.  Where can I learn more?   Food and Drug Administration  https://www.fda.gov/ForConsumers/ConsumerUpdates/obo862057.htm   NHS Choices  https://www.nhs.uk/conditions/Hay-fever/   Last Reviewed Date   2021-06-21  Consumer Information Use and Disclaimer   This information is not specific medical advice and does not replace information you receive from your health care provider. This is only a brief summary of general information. It does NOT include all information about conditions, illnesses, injuries, tests, procedures, treatments, therapies, discharge instructions or life-style choices that may apply to you. You must talk with your health care provider for complete information about your health and treatment options. This information should not be used to decide whether or not to accept your health care providers advice, instructions or recommendations. Only your health care provider has the knowledge and training to provide advice that is right for you.  Copyright   Copyright © 2021 [employer] and its affiliates and/or licensors. All rights reserved.    Patient Education       Low Cholesterol, Saturated Fat, and Trans Fat Diet   About this topic   Cholesterol, saturated fat, and trans fat are in many foods. These may raise your blood cholesterol levels. If your cholesterol is too high, this can  "cause health problems in your heart, liver, kidneys, and even your eyes. The key to lowering your risk of heart problems is to lower your bad fat intake.  Saturated fats and trans fats are the bad fats. These fats clog your arteries and raise your bad cholesterol. Saturated fats and trans fats are solid fats at room temperature. Saturated fats are animal fats. Trans fats are manmade fats. They add flavor to a lot of packaged foods. Staying away from saturated and trans fats will help your heart.  When you do eat foods with fat, make sure they have the good fats. Monounsaturated and polyunsaturated fats are good fats. These fats help raise your good cholesterol and protect your heart.  General   How to Lower Fat and Cholesterol in Your Diet   Read the labels of the foods you buy from the market to find out how much fat is present. Under 5% of total fat on a label means it is "low fat". Over 20% of total fat on a label means it is high fat.  Eat high fiber foods, like soluble fiber. This type of fiber helps lower cholesterol in the body. Choose oatmeal, fruits (like apples), beans, and nuts to get the most soluble fiber.  Eat foods high in omega-3 fatty acids like gurvinder seeds, walnuts, salmon, tuna, trout, herring, flaxseed, and soybeans. These foods help keep the heart healthy.  Limit your bad fat and oil intake.  Stay away from butter, stick margarine, shortening, lard, and palm and coconut oil. Pick plant-based spreads instead.  Limit mayonnaise, salad dressings, gravies, and sauces, unless it is made from low-fat ingredients.  Limit chocolate.  Do not eat high-fat processed foods like hot dogs, julian, sausage, ham and other luncheon meats high in fat, and some frozen foods. Pick fish, chicken, turkey, and lean meats instead.  Eat more dried beans, lentils, and tofu to get your protein.  Do not eat organ meats, like liver.  Choose nonfat or low-fat milk, yogurt, and cheese.  Use light or fat-free cream cheese and " sour cream.  Eat lots of fruits and vegetables.  Pick whole grain breads, cereals, pastas, and rice.  Do not eat snacks that are high in fats like granola, cookies, pies, pastries, doughnuts, and croissants.  Stay away from deep fried foods.  Help When Cooking   Remove the fat portion of meats and the skin from poultry before cooking.  Bake, broil, grill, poach, or roast poultry, fish, and lean meats.  Drain and throw away the fat that drains out of meat as you cook it.  Try to add little or no fat to foods.  Use olive or canola oil for cooking or baking.  Steam your vegetables.  Use herbs or no-oil marinades to flavor foods.         Who should use this diet?   This diet is for people who are at high risk of getting health problems like heart disease, high blood pressure, diabetes, and others. This diet is also good for all people to follow to keep your heart healthy.  What foods are good to eat?   Foods with good fats are:  Canola, peanut, and olive oil  Safflower, soybean, and corn oil  Walnuts, almonds, cashews, and peanuts  Pumpkin and sunflower seeds  [place] and tuna  Tofu  Soymilk  Avocado  What foods should be limited or avoided?   Stay away from these types of foods that have saturated fats:  Whole fat dairy products like cheese, ice cream, whole milk, and cream  Palm and coconut oils  High-fat meats like beef, lamb, poultry with the skin, julian, and sausage  Butter and lard  Stay away from these types of foods that may have trans fat:  Cookies, cakes, candy, doughnuts, baked goods, muffins, pizza dough, and pie crusts that are packaged  Fried foods  Frozen dinners  Chips and crackers  Microwave popcorn  Stick margarine and vegetable shortenings  Helpful tips   To help stay away from saturated fat:  Pick lean cuts of meat  Take the skin off chicken and turkey or pick skinless  Pick low-fat cheese, milk, and ice cream  Use liquid oils when cooking and baking, such as olive oil and canola oil  To help stay  away from trans fat:  Look at your labels. Choose foods with 0% trans fat. Read the ingredient list. Avoid foods with partially hydrogenated oil in the ingredient list. This means there is trans fat in the product.  Where can I learn more?   American Heart Association   http://www.heart.org/HEARTORG/Conditions/Cholesterol/PreventionTreatmentofHighCholesterol/Know-Your-Fats_UC_305628_Article.jsp   Last Reviewed Date   2021-10-05  Consumer Information Use and Disclaimer   This information is not specific medical advice and does not replace information you receive from your health care provider. This is only a brief summary of general information. It does NOT include all information about conditions, illnesses, injuries, tests, procedures, treatments, therapies, discharge instructions or life-style choices that may apply to you. You must talk with your health care provider for complete information about your health and treatment options. This information should not be used to decide whether or not to accept your health care providers advice, instructions or recommendations. Only your health care provider has the knowledge and training to provide advice that is right for you.   Copyright   Copyright © 2021 [employer], [employer]. and its affiliates and/or licensors. All rights reserved.

## 2022-12-27 ENCOUNTER — TELEPHONE (OUTPATIENT)
Dept: PRIMARY CARE CLINIC | Facility: CLINIC | Age: 22
End: 2022-12-27
Payer: MEDICAID

## 2022-12-27 NOTE — TELEPHONE ENCOUNTER
----- Message from Sandra Elder sent at 12/27/2022 12:14 PM CST -----  Type:  Needs Medical Advice  Who Called: Symptoms (please be specific): Jose Antonio Jeter ( pts Mother, Marycruz       How long has patient had these symptoms:  -  Pharmacy name and phone #:  -  Would the patient rather a call back or a response via [facility]? -  Best Call Back Number: [phone]  Additional Information: wants to know if you accept [employer] medicaid please call

## 2023-02-22 ENCOUNTER — TELEPHONE (OUTPATIENT)
Dept: PRIMARY CARE CLINIC | Facility: CLINIC | Age: 23
End: 2023-02-22
Payer: MEDICAID

## 2023-02-22 DIAGNOSIS — J06.9 ACUTE UPPER RESPIRATORY INFECTION: Primary | ICD-10-CM

## 2023-02-22 DIAGNOSIS — R05.1 ACUTE COUGH: ICD-10-CM

## 2023-02-22 RX ORDER — PROMETHAZINE HYDROCHLORIDE AND DEXTROMETHORPHAN HYDROBROMIDE 6.25; 15 MG/5ML; MG/5ML
5 SYRUP ORAL EVERY 4 HOURS PRN
Qty: 118 ML | Refills: 0 | Status: SHIPPED | OUTPATIENT
Start: 2023-02-22 | End: 2024-01-30

## 2023-02-22 RX ORDER — AZITHROMYCIN 250 MG/1
TABLET, FILM COATED ORAL
Qty: 6 TABLET | Refills: 0 | Status: SHIPPED | OUTPATIENT
Start: 2023-02-22 | End: 2023-02-27

## 2023-02-22 NOTE — TELEPHONE ENCOUNTER
----- Message from Salome Navas MA sent at 2/22/2023 10:23 AM CST -----  Regarding: FW: pt advice  Contact: Marycruz/mom    ----- Message -----  From: Elise Velasco  Sent: 2/22/2023   8:19 AM CST  To: [facility] Staff  Subject: pt advice                                        Type:  Needs Medical Advice    Who Called:  Marycruz /sonam   Symptoms (please be specific):  cough congestion    How long has patient had these symptoms:   Friday   Pharmacy name and phone #:       [facility] - [place], [place] - [address] [facility]  [address] [facility] [address]  Phone: [phone] Fax: [phone]    Would the patient rather a call back or a response via [facility]?  Call back   Best Call Back Number:  [phone]  Additional Information:

## 2023-05-05 ENCOUNTER — OFFICE VISIT (OUTPATIENT)
Dept: PRIMARY CARE CLINIC | Facility: CLINIC | Age: 23
End: 2023-05-05
Payer: MEDICAID

## 2023-05-05 VITALS
WEIGHT: 284 LBS | OXYGEN SATURATION: 100 % | DIASTOLIC BLOOD PRESSURE: 78 MMHG | SYSTOLIC BLOOD PRESSURE: 130 MMHG | RESPIRATION RATE: 18 BRPM | BODY MASS INDEX: 42.06 KG/M2 | HEIGHT: 69 IN | HEART RATE: 101 BPM

## 2023-05-05 DIAGNOSIS — Z00.00 ANNUAL PHYSICAL EXAM: Primary | ICD-10-CM

## 2023-05-05 DIAGNOSIS — J30.2 SEASONAL ALLERGIES: ICD-10-CM

## 2023-05-05 DIAGNOSIS — Z11.59 NEED FOR HEPATITIS C SCREENING TEST: ICD-10-CM

## 2023-05-05 DIAGNOSIS — Z13.29 THYROID DISORDER SCREEN: ICD-10-CM

## 2023-05-05 DIAGNOSIS — Z13.220 SCREENING CHOLESTEROL LEVEL: ICD-10-CM

## 2023-05-05 DIAGNOSIS — Z11.4 SCREENING FOR HIV (HUMAN IMMUNODEFICIENCY VIRUS): ICD-10-CM

## 2023-05-05 DIAGNOSIS — M65.9 SYNOVITIS OF LEFT ANKLE: ICD-10-CM

## 2023-05-05 DIAGNOSIS — M79.672 CHRONIC FOOT PAIN, LEFT: ICD-10-CM

## 2023-05-05 DIAGNOSIS — G89.29 CHRONIC FOOT PAIN, LEFT: ICD-10-CM

## 2023-05-05 DIAGNOSIS — E66.01 CLASS 3 SEVERE OBESITY DUE TO EXCESS CALORIES WITHOUT SERIOUS COMORBIDITY WITH BODY MASS INDEX (BMI) OF 40.0 TO 44.9 IN ADULT: ICD-10-CM

## 2023-05-05 DIAGNOSIS — M77.52 TENDINITIS OF LEFT FOOT: ICD-10-CM

## 2023-05-05 DIAGNOSIS — M77.52 TENDINITIS OF LEFT ANKLE: ICD-10-CM

## 2023-05-05 PROCEDURE — 99395 PR PREVENTIVE VISIT,EST,18-39: ICD-10-PCS | Mod: S$GLB,,, | Performed by: NURSE PRACTITIONER

## 2023-05-05 PROCEDURE — 3078F DIAST BP <80 MM HG: CPT | Mod: CPTII,S$GLB,, | Performed by: NURSE PRACTITIONER

## 2023-05-05 PROCEDURE — 1159F MED LIST DOCD IN RCRD: CPT | Mod: CPTII,S$GLB,, | Performed by: NURSE PRACTITIONER

## 2023-05-05 PROCEDURE — 3008F PR BODY MASS INDEX (BMI) DOCUMENTED: ICD-10-PCS | Mod: CPTII,S$GLB,, | Performed by: NURSE PRACTITIONER

## 2023-05-05 PROCEDURE — 99395 PREV VISIT EST AGE 18-39: CPT | Mod: S$GLB,,, | Performed by: NURSE PRACTITIONER

## 2023-05-05 PROCEDURE — 3008F BODY MASS INDEX DOCD: CPT | Mod: CPTII,S$GLB,, | Performed by: NURSE PRACTITIONER

## 2023-05-05 PROCEDURE — 3078F PR MOST RECENT DIASTOLIC BLOOD PRESSURE < 80 MM HG: ICD-10-PCS | Mod: CPTII,S$GLB,, | Performed by: NURSE PRACTITIONER

## 2023-05-05 PROCEDURE — 1160F RVW MEDS BY RX/DR IN RCRD: CPT | Mod: CPTII,S$GLB,, | Performed by: NURSE PRACTITIONER

## 2023-05-05 PROCEDURE — 1159F PR MEDICATION LIST DOCUMENTED IN MEDICAL RECORD: ICD-10-PCS | Mod: CPTII,S$GLB,, | Performed by: NURSE PRACTITIONER

## 2023-05-05 PROCEDURE — 3075F PR MOST RECENT SYSTOLIC BLOOD PRESS GE 130-139MM HG: ICD-10-PCS | Mod: CPTII,S$GLB,, | Performed by: NURSE PRACTITIONER

## 2023-05-05 PROCEDURE — 3075F SYST BP GE 130 - 139MM HG: CPT | Mod: CPTII,S$GLB,, | Performed by: NURSE PRACTITIONER

## 2023-05-05 PROCEDURE — 1160F PR REVIEW ALL MEDS BY PRESCRIBER/CLIN PHARMACIST DOCUMENTED: ICD-10-PCS | Mod: CPTII,S$GLB,, | Performed by: NURSE PRACTITIONER

## 2023-05-05 RX ORDER — MELOXICAM 15 MG/1
15 TABLET ORAL DAILY
Qty: 90 TABLET | Refills: 1 | Status: SHIPPED | OUTPATIENT
Start: 2023-05-05 | End: 2024-01-30

## 2023-05-05 NOTE — PATIENT INSTRUCTIONS
RTC in 6 months for F/U or sooner if needed.    Patient Education       Yearly Physical for Adults   About this topic   Most people do not want to be sick. Having a checkup each year with your doctor is one way to help you stay healthy. You may need to see your doctor more or less often. How often you need to go to the doctor depends on your age. Your family and medical history also play a role in how often you need to go to the doctor. Going to see your doctor on a routine basis can help you find problems early or even before they start. This may make it easier to treat or cure your problem.  General   Your doctor will talk about many things during your checkup. Your doctor may ask about:  Your medical and family history.  All the drugs you are taking. Be sure to include all prescription, over the counter, and herbal supplements. Tell the doctor if you have any drug allergy. Bring a list of drugs you take with you.  How you are feeling and if you are having any problems.  Risky behaviors like smoking, drinking alcohol, using illegal drugs, not wearing seatbelts, having unprotected sex, etc.  Your doctor will do a physical exam and may check your:  Height and weight  Blood pressure  Reflexes  Memory  Vision  Hearing  Your doctor may order:  Lab tests  ECG to check your heart rhythm  X-rays  Tests or treatments based on your exam  What lifestyle changes are needed?   Your doctor may suggest you make changes to your lifestyle at this visit. The doctor may talk with you about being more active or lowering stress levels. Ask your doctor what you need to do.  What drugs may be needed?   Your doctor may order drugs or vaccines to protect you from illnesses.  What changes to diet are needed?   Talk to your doctor to see if any changes are needed to your diet.  When do I need to call the doctor?   Call your doctor if you need to learn about any test results. Together you can make a plan for more care.  Helpful tips   Make a  list of questions for your doctor before you go. This will help you remember to ask about any concerns. Write down any answers from your doctor so you can look over them after your visit.   Tell your doctor about any changes in your body or health since your last visit.  Ask your doctor about any screening tests you need.  Where can I learn more?   American Academy of [employer]icians  http://familydoctor.org/familydoctor/en/prevention-wellness/staying-healthy/healthy-living/preventive-services-for-healthy-living.printerview.html   Centers for Disease Control  http://www.cdc.gov/family/checkup/   Last Reviewed Date   2019-04-22  Consumer Information Use and Disclaimer   This information is not specific medical advice and does not replace information you receive from your health care provider. This is only a brief summary of general information. It does NOT include all information about conditions, illnesses, injuries, tests, procedures, treatments, therapies, discharge instructions or life-style choices that may apply to you. You must talk with your health care provider for complete information about your health and treatment options. This information should not be used to decide whether or not to accept your health care providers advice, instructions or recommendations. Only your health care provider has the knowledge and training to provide advice that is right for you.  Copyright   Copyright © 2021 [employer] and its affiliates and/or licensors. All rights reserved.

## 2023-05-05 NOTE — PROGRESS NOTES
Subjective:       Patient ID: Jose Antonio Jeter is a 22 y.o. male.    Chief Complaint: Annual Exam    HPI: Jose Antonio is a 22 y.o. male who presents for annual visit.    Still with C/O left foot and heel pain which he was referred to Dr. Moreno in the past and found to have tendinitis. He continues to have pain and occasional swelling to his left foot so will refer to a Podiatrist for further evaluation. He does have meloxicam for his pain and swelling but it helps only minimally.    Cholesterol slightly elevated on recent labs. Admits to not eating healthy but will cut back on eating fried food and fast food. All other labs were normal.    History of seasonal allergies which are controlled well with zyrtec.    Denies smoking or drinking.              History reviewed. No pertinent past medical history.    History reviewed. No pertinent surgical history.    History reviewed. No pertinent family history.    Social History     Tobacco Use    Smoking status: Never    Smokeless tobacco: Never   Substance Use Topics    Alcohol use: Never       Patient Active Problem List   Diagnosis    Seasonal allergies    Class 3 severe obesity due to excess calories without serious comorbidity with body mass index (BMI) of 40.0 to 44.9 in adult    Other hyperlipidemia    Chronic foot pain, left    Tendinitis of left foot       Immunization History   Administered Date(s) Administered    COVID-19, MRNA, LN-S, PF (Pfizer) (Purple Cap) 08/01/2021, 08/31/2021           Review of Systems   Constitutional:  Negative for activity change, appetite change, chills, diaphoresis, fatigue and fever.   HENT:  Negative for congestion, ear pain, tinnitus and trouble swallowing.    Eyes:  Negative for visual disturbance.   Respiratory:  Negative for cough, chest tightness, shortness of breath and wheezing.    Cardiovascular:  Negative for chest pain, palpitations and leg swelling.   Gastrointestinal:  Negative for abdominal distention, abdominal pain, blood  "in stool, constipation, diarrhea, nausea and vomiting.   Endocrine: Negative for cold intolerance, heat intolerance, polydipsia, polyphagia and polyuria.   Genitourinary:  Negative for difficulty urinating, dysuria, frequency, hematuria and urgency.   Musculoskeletal:  Positive for arthralgias (left foot) and joint swelling (left foot). Negative for back pain, gait problem, myalgias, neck pain and neck stiffness.   Skin:  Negative for color change and rash.   Allergic/Immunologic: Positive for environmental allergies.   Neurological:  Negative for dizziness, speech difficulty, weakness, light-headedness, numbness and headaches.   Hematological:  Negative for adenopathy. Does not bruise/bleed easily.   Psychiatric/Behavioral:  Negative for behavioral problems, confusion and dysphoric mood. The patient is not nervous/anxious.    Objective:     Vitals:    05/05/23 0812   BP: 130/78   BP Location: Right arm   Patient Position: Sitting   BP Method: Medium (Automatic)   Pulse: 101   Resp: 18   SpO2: 100%   Weight: 128.8 kg (284 lb)   Height: 5' 9" (1.753 m)       Physical Exam  Vitals and nursing note reviewed.   Constitutional:       General: He is not in acute distress.     Appearance: Normal appearance. He is obese. He is not diaphoretic.   HENT:      Head: Normocephalic and atraumatic.      Mouth/Throat:      Mouth: Mucous membranes are moist.      Pharynx: Oropharynx is clear.   Eyes:      General:         Right eye: No discharge.         Left eye: No discharge.      Extraocular Movements: Extraocular movements intact.      Conjunctiva/sclera: Conjunctivae normal.      Pupils: Pupils are equal, round, and reactive to light.   Neck:      Thyroid: No thyromegaly or thyroid tenderness.   Cardiovascular:      Rate and Rhythm: Normal rate and regular rhythm.      Pulses: Normal pulses.      Heart sounds: Normal heart sounds.   Pulmonary:      Effort: Pulmonary effort is normal.      Breath sounds: Normal breath sounds. No " stridor. No wheezing or rales.   Abdominal:      General: Bowel sounds are normal. There is no distension.      Palpations: Abdomen is soft.      Tenderness: There is no abdominal tenderness. There is no guarding.   Genitourinary:     Penis: Normal.       Testes: Normal.   Musculoskeletal:         General: Tenderness (left foot, heel) present. Normal range of motion.      Cervical back: Normal range of motion and neck supple.      Right lower leg: No edema.      Left lower leg: No edema.   Lymphadenopathy:      Cervical: No cervical adenopathy.   Skin:     General: Skin is warm and dry.      Capillary Refill: Capillary refill takes less than 2 seconds.      Findings: No rash.   Neurological:      Mental Status: He is alert and oriented to person, place, and time.   Psychiatric:         Mood and Affect: Mood and affect normal.         Behavior: Behavior normal. Behavior is cooperative.       No visits with results within 6 Month(s) from this visit.   Latest known visit with results is:   Office Visit on 05/10/2022   Component Date Value Ref Range Status    WBC 07/25/2022 3.7 (L)  3.8 - 10.8 Thousand/uL Final    RBC 07/25/2022 4.84  4.20 - 5.80 Million/uL Final    Hemoglobin 07/25/2022 14.7  13.2 - 17.1 g/dL Final    Hematocrit 07/25/2022 43.0  38.5 - 50.0 % Final    MCV 07/25/2022 88.8  80.0 - 100.0 fL Final    MCH 07/25/2022 30.4  27.0 - 33.0 pg Final    MCHC 07/25/2022 34.2  32.0 - 36.0 g/dL Final    RDW 07/25/2022 12.6  11.0 - 15.0 % Final    Platelets 07/25/2022 262  140 - 400 Thousand/uL Final    MPV 07/25/2022 10.4  7.5 - 12.5 fL Final    Neutrophils, Abs 07/25/2022 1,302 (L)  1,500 - 7,800 cells/uL Final    Lymph # 07/25/2022 2,050  850 - 3,900 cells/uL Final    Mono # 07/25/2022 270  200 - 950 cells/uL Final    Eos # 07/25/2022 59  15 - 500 cells/uL Final    Baso # 07/25/2022 19  0 - 200 cells/uL Final    Neutrophils Relative 07/25/2022 35.2  % Final    Lymph % 07/25/2022 55.4  % Final    Mono % 07/25/2022  7.3  % Final    Eosinophil % 07/25/2022 1.6  % Final    Basophil % 07/25/2022 0.5  % Final    Glucose 07/25/2022 84  65 - 99 mg/dL Final    BUN 07/25/2022 13  7 - 25 mg/dL Final    Creatinine 07/25/2022 0.66  0.60 - 1.24 mg/dL Final    eGFR 07/25/2022 138  > OR = 60 mL/min/1.73m2 Final    BUN/Creatinine Ratio 07/25/2022 NOT APPLICABLE  6 - 22 (calc) Final    Sodium 07/25/2022 138  135 - 146 mmol/L Final    Potassium 07/25/2022 3.8  3.5 - 5.3 mmol/L Final    Chloride 07/25/2022 100  98 - 110 mmol/L Final    CO2 07/25/2022 26  20 - 32 mmol/L Final    Calcium 07/25/2022 9.6  8.6 - 10.3 mg/dL Final    Total Protein 07/25/2022 7.8  6.1 - 8.1 g/dL Final    Albumin 07/25/2022 4.3  3.6 - 5.1 g/dL Final    Globulin, Total 07/25/2022 3.5  1.9 - 3.7 g/dL (calc) Final    Albumin/Globulin Ratio 07/25/2022 1.2  1.0 - 2.5 (calc) Final    Total Bilirubin 07/25/2022 0.6  0.2 - 1.2 mg/dL Final    Alkaline Phosphatase 07/25/2022 72  36 - 130 U/L Final    AST 07/25/2022 28  10 - 40 U/L Final    ALT 07/25/2022 34  9 - 46 U/L Final    TSH 07/25/2022 0.80  0.40 - 4.50 mIU/L Final    Cholesterol 07/25/2022 197  <200 mg/dL Final    HDL 07/25/2022 50  > OR = 40 mg/dL Final    Triglycerides 07/25/2022 76  <150 mg/dL Final    LDL Cholesterol 07/25/2022 130 (H)  mg/dL (calc) Final    HDL/Cholesterol Ratio 07/25/2022 3.9  <5.0 (calc) Final    Non HDL Chol. (LDL+VLDL) 07/25/2022 147 (H)  <130 mg/dL (calc) Final         Assessment:      1. Annual physical exam    2. Seasonal allergies    3. Class 3 severe obesity due to excess calories without serious comorbidity with body mass index (BMI) of 40.0 to 44.9 in adult    4. Screening cholesterol level    5. Thyroid disorder screen    6. Screening for HIV (human immunodeficiency virus)    7. Need for hepatitis C screening test    8. Tendinitis of left ankle    9. Synovitis of left ankle    10. Chronic foot pain, left    11. Tendinitis of left foot          Plan:     Annual physical  exam  Comments:  Will review labs and determine POC based on results.  Orders:  -     CBC Auto Differential; Future; Expected date: 05/05/2023  -     TSH w/reflex to FT4; Future; Expected date: 05/05/2023  -     Comprehensive Metabolic Panel; Future; Expected date: 05/05/2023  -     Lipid Panel; Future; Expected date: 05/05/2023    Seasonal allergies    Class 3 severe obesity due to excess calories without serious comorbidity with body mass index (BMI) of 40.0 to 44.9 in adult    Screening cholesterol level  -     Lipid Panel; Future; Expected date: 05/05/2023    Thyroid disorder screen  -     TSH w/reflex to FT4; Future; Expected date: 05/05/2023    Screening for HIV (human immunodeficiency virus)  -     HIV 1/2 Ag/Ab (4th Gen); Future; Expected date: 05/05/2023    Need for hepatitis C screening test  -     Hepatitis C Antibody; Future; Expected date: 05/05/2023    Tendinitis of left ankle  -     meloxicam (MOBIC) 15 MG tablet; Take 1 tablet (15 mg total) by mouth once daily.  Dispense: 90 tablet; Refill: 1    Synovitis of left ankle  -     meloxicam (MOBIC) 15 MG tablet; Take 1 tablet (15 mg total) by mouth once daily.  Dispense: 90 tablet; Refill: 1    Chronic foot pain, left  -     Ambulatory referral/consult to Podiatry; Future; Expected date: 05/12/2023    Tendinitis of left foot  -     Ambulatory referral/consult to Podiatry; Future; Expected date: 05/12/2023         Current Outpatient Medications   Medication Sig Dispense Refill    meloxicam (MOBIC) 15 MG tablet Take 1 tablet (15 mg total) by mouth once daily. 90 tablet 1    promethazine-dextromethorphan (PROMETHAZINE-DM) 6.25-15 mg/5 mL Syrp Take 5 mLs by mouth every 4 (four) hours as needed (cough). (Patient not taking: Reported on 5/5/2023) 118 mL 0     No current facility-administered medications for this visit.       Medications Discontinued During This Encounter   Medication Reason    meloxicam (MOBIC) 15 MG tablet Reorder       Health Maintenance    Topic Date Due    Hepatitis C Screening  Never done    HPV Vaccines (1 - Male 2-dose series) Never done    TETANUS VACCINE  Never done    Lipid Panel  Completed       Patient Instructions   RTC in 6 months for F/U or sooner if needed.    Patient Education       Yearly Physical for Adults   About this topic   Most people do not want to be sick. Having a checkup each year with your doctor is one way to help you stay healthy. You may need to see your doctor more or less often. How often you need to go to the doctor depends on your age. Your family and medical history also play a role in how often you need to go to the doctor. Going to see your doctor on a routine basis can help you find problems early or even before they start. This may make it easier to treat or cure your problem.  General   Your doctor will talk about many things during your checkup. Your doctor may ask about:  Your medical and family history.  All the drugs you are taking. Be sure to include all prescription, over the counter, and herbal supplements. Tell the doctor if you have any drug allergy. Bring a list of drugs you take with you.  How you are feeling and if you are having any problems.  Risky behaviors like smoking, drinking alcohol, using illegal drugs, not wearing seatbelts, having unprotected sex, etc.  Your doctor will do a physical exam and may check your:  Height and weight  Blood pressure  Reflexes  Memory  Vision  Hearing  Your doctor may order:  Lab tests  ECG to check your heart rhythm  X-rays  Tests or treatments based on your exam  What lifestyle changes are needed?   Your doctor may suggest you make changes to your lifestyle at this visit. The doctor may talk with you about being more active or lowering stress levels. Ask your doctor what you need to do.  What drugs may be needed?   Your doctor may order drugs or vaccines to protect you from illnesses.  What changes to diet are needed?   Talk to your doctor to see if any changes  are needed to your diet.  When do I need to call the doctor?   Call your doctor if you need to learn about any test results. Together you can make a plan for more care.  Helpful tips   Make a list of questions for your doctor before you go. This will help you remember to ask about any concerns. Write down any answers from your doctor so you can look over them after your visit.   Tell your doctor about any changes in your body or health since your last visit.  Ask your doctor about any screening tests you need.  Where can I learn more?   [employer]  http://familydoctor.org/familydoctor/en/prevention-wellness/staying-healthy/healthy-living/preventive-services-for-healthy-living.printerview.html   Centers for Disease Control  http://www.cdc.gov/family/checkup/   Last Reviewed Date   2019-04-22  Consumer Information Use and Disclaimer   This information is not specific medical advice and does not replace information you receive from your health care provider. This is only a brief summary of general information. It does NOT include all information about conditions, illnesses, injuries, tests, procedures, treatments, therapies, discharge instructions or life-style choices that may apply to you. You must talk with your health care provider for complete information about your health and treatment options. This information should not be used to decide whether or not to accept your health care providers advice, instructions or recommendations. Only your health care provider has the knowledge and training to provide advice that is right for you.  Copyright   Copyright © 2021 [employer] and its affiliates and/or licensors. All rights reserved.       Risks, benefits, and alternatives discussed with patient, Patient verbalized understanding of discussed plan of care. Asked patient if any further questions, answered no.    Future Appointments   Date Time Provider Department Center   11/1/2023  8:00  HU Galo NP [id number] EDYTA Galo NP

## 2023-05-08 ENCOUNTER — TELEPHONE (OUTPATIENT)
Dept: FAMILY MEDICINE | Facility: CLINIC | Age: 23
End: 2023-05-08
Payer: MEDICAID

## 2023-05-08 LAB — HIV1/HIV2 ANTIBODY: NEGATIVE

## 2023-05-10 PROBLEM — G89.29 CHRONIC FOOT PAIN, LEFT: Status: ACTIVE | Noted: 2023-05-10

## 2023-05-10 PROBLEM — M77.52 TENDINITIS OF LEFT FOOT: Status: ACTIVE | Noted: 2023-05-10

## 2023-05-10 PROBLEM — M79.672 CHRONIC FOOT PAIN, LEFT: Status: ACTIVE | Noted: 2023-05-10

## 2023-05-11 DIAGNOSIS — Z11.4 SCREENING FOR HIV (HUMAN IMMUNODEFICIENCY VIRUS): Primary | ICD-10-CM

## 2023-05-23 ENCOUNTER — PATIENT MESSAGE (OUTPATIENT)
Dept: PRIMARY CARE CLINIC | Facility: CLINIC | Age: 23
End: 2023-05-23
Payer: MEDICAID

## 2023-05-23 DIAGNOSIS — Z11.4 SCREENING FOR HIV (HUMAN IMMUNODEFICIENCY VIRUS): Primary | ICD-10-CM

## 2023-06-26 ENCOUNTER — PATIENT OUTREACH (OUTPATIENT)
Dept: ADMINISTRATIVE | Facility: HOSPITAL | Age: 23
End: 2023-06-26
Payer: MEDICAID

## 2024-01-30 ENCOUNTER — OFFICE VISIT (OUTPATIENT)
Dept: PRIMARY CARE CLINIC | Facility: CLINIC | Age: 24
End: 2024-01-30
Payer: COMMERCIAL

## 2024-01-30 VITALS
TEMPERATURE: 98 F | HEART RATE: 88 BPM | SYSTOLIC BLOOD PRESSURE: 132 MMHG | BODY MASS INDEX: 41.03 KG/M2 | DIASTOLIC BLOOD PRESSURE: 80 MMHG | RESPIRATION RATE: 18 BRPM | OXYGEN SATURATION: 98 % | HEIGHT: 69 IN | WEIGHT: 277 LBS

## 2024-01-30 DIAGNOSIS — Z00.00 ANNUAL PHYSICAL EXAM: Primary | ICD-10-CM

## 2024-01-30 DIAGNOSIS — E78.49 OTHER HYPERLIPIDEMIA: ICD-10-CM

## 2024-01-30 DIAGNOSIS — Z13.220 SCREENING CHOLESTEROL LEVEL: ICD-10-CM

## 2024-01-30 DIAGNOSIS — J30.2 SEASONAL ALLERGIES: ICD-10-CM

## 2024-01-30 DIAGNOSIS — E66.01 CLASS 3 SEVERE OBESITY DUE TO EXCESS CALORIES WITHOUT SERIOUS COMORBIDITY WITH BODY MASS INDEX (BMI) OF 40.0 TO 44.9 IN ADULT: ICD-10-CM

## 2024-01-30 DIAGNOSIS — R35.0 URINE FREQUENCY: ICD-10-CM

## 2024-01-30 DIAGNOSIS — Z13.1 DIABETES MELLITUS SCREENING: ICD-10-CM

## 2024-01-30 DIAGNOSIS — E55.9 VITAMIN D DEFICIENCY: ICD-10-CM

## 2024-01-30 DIAGNOSIS — Z13.29 THYROID DISORDER SCREEN: ICD-10-CM

## 2024-01-30 PROCEDURE — 99395 PREV VISIT EST AGE 18-39: CPT | Mod: S$GLB,,, | Performed by: NURSE PRACTITIONER

## 2024-01-30 PROCEDURE — 3008F BODY MASS INDEX DOCD: CPT | Mod: CPTII,S$GLB,, | Performed by: NURSE PRACTITIONER

## 2024-01-30 PROCEDURE — 3079F DIAST BP 80-89 MM HG: CPT | Mod: CPTII,S$GLB,, | Performed by: NURSE PRACTITIONER

## 2024-01-30 PROCEDURE — 1160F RVW MEDS BY RX/DR IN RCRD: CPT | Mod: CPTII,S$GLB,, | Performed by: NURSE PRACTITIONER

## 2024-01-30 PROCEDURE — 3075F SYST BP GE 130 - 139MM HG: CPT | Mod: CPTII,S$GLB,, | Performed by: NURSE PRACTITIONER

## 2024-01-30 PROCEDURE — 1159F MED LIST DOCD IN RCRD: CPT | Mod: CPTII,S$GLB,, | Performed by: NURSE PRACTITIONER

## 2024-01-30 RX ORDER — MONTELUKAST SODIUM 10 MG/1
10 TABLET ORAL NIGHTLY
Qty: 30 TABLET | Refills: 5 | Status: SHIPPED | OUTPATIENT
Start: 2024-01-30

## 2024-01-30 NOTE — PATIENT INSTRUCTIONS
RTC in 6 months for F/U or sooner if needed.    Patient Education       Yearly Physical for Adults   About this topic   Most people do not want to be sick. Having a checkup each year with your doctor is one way to help you stay healthy. You may need to see your doctor more or less often. How often you need to go to the doctor depends on your age. Your family and medical history also play a role in how often you need to go to the doctor. Going to see your doctor on a routine basis can help you find problems early or even before they start. This may make it easier to treat or cure your problem.  General   Your doctor will talk about many things during your checkup. Your doctor may ask about:  Your medical and family history.  All the drugs you are taking. Be sure to include all prescription, over the counter, and herbal supplements. Tell the doctor if you have any drug allergy. Bring a list of drugs you take with you.  How you are feeling and if you are having any problems.  Risky behaviors like smoking, drinking alcohol, using illegal drugs, not wearing seatbelts, having unprotected sex, etc.  Your doctor will do a physical exam and may check your:  Height and weight  Blood pressure  Reflexes  Memory  Vision  Hearing  Your doctor may order:  Lab tests  ECG to check your heart rhythm  X-rays  Tests or treatments based on your exam  What lifestyle changes are needed?   Your doctor may suggest you make changes to your lifestyle at this visit. The doctor may talk with you about being more active or lowering stress levels. Ask your doctor what you need to do.  What drugs may be needed?   Your doctor may order drugs or vaccines to protect you from illnesses.  What changes to diet are needed?   Talk to your doctor to see if any changes are needed to your diet.  When do I need to call the doctor?   Call your doctor if you need to learn about any test results. Together you can make a plan for more care.  Helpful tips   Make a  list of questions for your doctor before you go. This will help you remember to ask about any concerns. Write down any answers from your doctor so you can look over them after your visit.   Tell your doctor about any changes in your body or health since your last visit.  Ask your doctor about any screening tests you need.  Where can I learn more?   American Academy [employer]  http://[email or web address]/familydoctor/en/prevention-wellness/staying-healthy/healthy-living/preventive-services-for-healthy-living.printerview.html   Centers for Disease Control  http://www.cdc.gov/family/checkup/   Last Reviewed Date   2019-04-22  Consumer Information Use and Disclaimer   This information is not specific medical advice and does not replace information you receive from your health care provider. This is only a brief summary of general information. It does NOT include all information about conditions, illnesses, injuries, tests, procedures, treatments, therapies, discharge instructions or life-style choices that may apply to you. You must talk with your health care provider for complete information about your health and treatment options. This information should not be used to decide whether or not to accept your health care providers advice, instructions or recommendations. Only your health care provider has the knowledge and training to provide advice that is right for you.  Copyright   Copyright © 2021 [employer] and its affiliates and/or licensors. All rights reserved.      Patient Education       Low Cholesterol, Saturated Fat, and Trans Fat Diet   About this topic   Cholesterol, saturated fat, and trans fat are in many foods. These may raise your blood cholesterol levels. If your cholesterol is too high, this can cause health problems in your heart, liver, kidneys, and even your eyes. The key to lowering your risk of heart problems is to lower your bad fat intake.  Saturated fats and trans fats are the bad  "fats. These fats clog your arteries and raise your bad cholesterol. Saturated fats and trans fats are solid fats at room temperature. Saturated fats are animal fats. Trans fats are manmade fats. They add flavor to a lot of packaged foods. Staying away from saturated and trans fats will help your heart.  When you do eat foods with fat, make sure they have the good fats. Monounsaturated and polyunsaturated fats are good fats. These fats help raise your good cholesterol and protect your heart.  General   How to Lower Fat and Cholesterol in Your Diet   Read the labels of the foods you buy from the market to find out how much fat is present. Under 5% of total fat on a label means it is "low fat". Over 20% of total fat on a label means it is high fat.  Eat high fiber foods, like soluble fiber. This type of fiber helps lower cholesterol in the body. Choose oatmeal, fruits (like apples), beans, and nuts to get the most soluble fiber.  Eat foods high in omega-3 fatty acids like gurvinder seeds, walnuts, salmon, tuna, trout, herring, flaxseed, and soybeans. These foods help keep the heart healthy.  Limit your bad fat and oil intake.  Stay away from butter, stick margarine, shortening, lard, and palm and coconut oil. Pick plant-based spreads instead.  Limit mayonnaise, salad dressings, gravies, and sauces, unless it is made from low-fat ingredients.  Limit chocolate.  Do not eat high-fat processed foods like hot dogs, julian, sausage, ham and other luncheon meats high in fat, and some frozen foods. Pick fish, chicken, turkey, and lean meats instead.  Eat more dried beans, lentils, and tofu to get your protein.  Do not eat organ meats, like liver.  Choose nonfat or low-fat milk, yogurt, and cheese.  Use light or fat-free cream cheese and sour cream.  Eat lots of fruits and vegetables.  Pick whole grain breads, cereals, pastas, and rice.  Do not eat snacks that are high in fats like granola, cookies, pies, pastries, doughnuts, and " croissants.  Stay away from deep fried foods.  Help When Cooking   Remove the fat portion of meats and the skin from poultry before cooking.  Bake, broil, grill, poach, or roast poultry, fish, and lean meats.  Drain and throw away the fat that drains out of meat as you cook it.  Try to add little or no fat to foods.  Use olive or canola oil for cooking or baking.  Steam your vegetables.  Use herbs or no-oil marinades to flavor foods.         Who should use this diet?   This diet is for people who are at high risk of getting health problems like heart disease, high blood pressure, diabetes, and others. This diet is also good for all people to follow to keep your heart healthy.  What foods are good to eat?   Foods with good fats are:  Canola, peanut, and olive oil  Safflower, soybean, and corn oil  Walnuts, almonds, cashews, and peanuts  Pumpkin and sunflower seeds  [place] and tuna  Tofu  Soymilk  Avocado  What foods should be limited or avoided?   Stay away from these types of foods that have saturated fats:  Whole fat dairy products like cheese, ice cream, whole milk, and cream  Palm and coconut oils  High-fat meats like beef, lamb, poultry with the skin, julian, and sausage  Butter and lard  Stay away from these types of foods that may have trans fat:  Cookies, cakes, candy, doughnuts, baked goods, muffins, pizza dough, and pie crusts that are packaged  Fried foods  Frozen dinners  Chips and crackers  Microwave popcorn  Stick margarine and vegetable shortenings  Helpful tips   To help stay away from saturated fat:  Pick lean cuts of meat  Take the skin off chicken and turkey or pick skinless  Pick low-fat cheese, milk, and ice cream  Use liquid oils when cooking and baking, such as olive oil and canola oil  To help stay away from trans fat:  Look at your labels. Choose foods with 0% trans fat. Read the ingredient list. Avoid foods with partially hydrogenated oil in the ingredient list. This means there is trans fat  in the product.  Where can I learn more?   American Heart Association   http://www.heart.org/HEARTORG/Conditions/Cholesterol/PreventionTreatmentofHighCholesterol/Know-Your-Fats_[facility]_305628_Article.jsp   Last Reviewed Date   2021-10-05  Consumer Information Use and Disclaimer   This information is not specific medical advice and does not replace information you receive from your health care provider. This is only a brief summary of general information. It does NOT include all information about conditions, illnesses, injuries, tests, procedures, treatments, therapies, discharge instructions or life-style choices that may apply to you. You must talk with your health care provider for complete information about your health and treatment options. This information should not be used to decide whether or not to accept your health care providers advice, instructions or recommendations. Only your health care provider has the knowledge and training to provide advice that is right for you.   Copyright   Copyright © 2021 [employer] and its affiliates and/or licensors. All rights reserved.    Patient Education       Seasonal Allergies Discharge Instructions   About this topic   Seasonal allergies are also called allergic rhinitis or hay fever. You may have sneezing; a stuffy or runny nose; or itchy throat, ears, or eyes. You may feel very tired. Most often, this problem is caused by:  Pollens from trees, grasses, or weeds.  Mold spores that grow when the air is humid, wet, or damp.  Some people can breathe in these things and have no problems. But when you have a seasonal allergy, your body acts as if the substance is harming you. Then you have symptoms. You may have symptoms only at some times of the year. If your symptoms last all year, they may be caused by:  Insects, such as dust mites or cock roaches.  Animals, such as cats or dogs.  Mold spores.     What care is needed at home?   Ask your doctor what you need to do  when you go home. Make sure you ask questions if you do not understand what the doctor says.  Rinse your nose with salt water to get rid of pollen inside of your nose.  Keep the windows closed and use air conditioning.  Shower before bed to rinse pollen from your hair and skin.  Wear a dust mask if you need to be outside.  Use over-the-counter medicines to help with your symptoms:  A steroid nose spray can help with a stuffy nose. It can also help with drainage down the back of your throat.  An antihistamine can help with itching, sneezing, or runny nose.  An antihistamine eye drop can help with itchy eyes.  A decongestant can help with a stuffy nose. Talk with your doctor or pharmacist about your other health problems before you use this kind of medicine. It may not be safe for people with high blood pressure.  What follow-up care is needed?   Your doctor may ask you to make visits to the office to check on your progress. Your doctor may ask you to see an allergy specialist, or to have testing done to learn what is causing your allergies. Be sure to keep these visits.  What drugs may be needed?   The doctor may order drugs to treat the signs. Take your drugs as ordered. You may also take allergy shots if you have had allergy tests.  Will physical activity be limited?   You may have to limit your activity. If your health problem is caused by an allergy to pollens or molds, you may want to limit your time outdoors. Talk to your doctor about what activities are best for you.  What problems could happen?   Your signs may not get better with your drugs  Asthma may get worse  Sinus infection  What can be done to prevent this health problem?   Try to avoid allergens.  If you are allergic to pollens, grasses, trees, etc:  Stay inside in the morning when pollen counts are high.  Avoid going outside when the trees, grasses, or weeds are blooming.  Keep the windows of your house and car closed.  Use an air conditioner.  If  you are allergic to dust, molds, dust mites, pets, etc:  Clean your air conditioner or furnace filters regularly.  Cover pillows and mattresses with vinyl covers to lower your exposure to dust mites.  Wash bedding weekly in very hot water.  Use fewer dust-collecting items, such as curtains, bed skirts, carpeting, and stuffed animals, mainly in your bedroom.  If you can't avoid having a furry pet, vacuum often and keep your pet out of bedrooms and other rooms with carpets.  When do I need to call the doctor?   You are having so much trouble breathing that you can only say one or two words at a time.  You need to sit upright at all times to be able to breathe and or cannot lie down.  You have severe swelling of your tongue, lips, or mouth.  You have trouble breathing when talking or sitting still.  You have a fever of 100.4°F (38°C) or higher.  You have green or yellow mucus.  Teach Back: Helping You Understand   The Teach Back Method helps you understand the information we are giving you. After you talk with the staff, tell them in your own words what you learned. This helps to make sure the staff has described each thing clearly. It also helps to explain things that may have been confusing. Before going home, make sure you can do these:  I can tell you about my condition.  I can tell you what may help make the air .  I can tell you what may help ease my allergies.  Where can I learn more?   Food and Drug Administration  https://www.fda.gov/ForConsumers/ConsumerUpdates/hgb415297.htm   [facility] Choices  https://www.nhs.uk/conditions/Hay-fever/   Last Reviewed Date   2021-06-21  Consumer Information Use and Disclaimer   This information is not specific medical advice and does not replace information you receive from your health care provider. This is only a brief summary of general information. It does NOT include all information about conditions, illnesses, injuries, tests, procedures, treatments, therapies,  discharge instructions or life-style choices that may apply to you. You must talk with your health care provider for complete information about your health and treatment options. This information should not be used to decide whether or not to accept your health care providers advice, instructions or recommendations. Only your health care provider has the knowledge and training to provide advice that is right for you.  Copyright   Copyright © 2021 [employer] and its affiliates and/or licensors. All rights reserved.

## 2024-01-30 NOTE — PROGRESS NOTES
Subjective:       Patient ID: Jose Antonio Jeter is a 23 y.o. male.    Chief Complaint: Follow-up    HPI: Jose Antonio is a 23 y.o. male who presents for annual visit.    Feels well today but Mr. Jeter says he has been having issues with his allergies flaring up and he does not find that the zyrtec is helping anymore.    Cholesterol slightly elevated on last labs labs. He has been eating healthier now he says and he has lost 8 pounds since last year.    No other issues or concerns today.    Denies smoking or drinking.                History reviewed. No pertinent past medical history.    History reviewed. No pertinent surgical history.    Family History   Problem Relation Age of Onset    Asthma Mother     Birth defects Mother     Heart disease Maternal Grandmother     Arthritis Maternal Uncle        Social History     Tobacco Use    Smoking status: Never    Smokeless tobacco: Never   Substance Use Topics    Alcohol use: Never    Drug use: Never       Patient Active Problem List   Diagnosis    Seasonal allergies    Class 3 severe obesity due to excess calories without serious comorbidity with body mass index (BMI) of 40.0 to 44.9 in adult    Other hyperlipidemia    Chronic foot pain, left    Tendinitis of left foot       Immunization History   Administered Date(s) Administered    COVID-19, MRNA, LN-S, PF (Pfizer) (Purple Cap) 07/31/2021, 08/01/2021, 08/21/2021, 08/31/2021    DTaP 2002, 2002, 02/17/2003, 08/22/2003, 05/18/2006    HIB 2002, 02/17/2003, 05/22/2003    Hep B / HiB 2002, 2002, 05/22/2003    IPV 2002, 2002, 05/22/2003, 05/18/2006    Influenza (Flumist) - Quadrivalent - Intranasal *Preferred* (2-49 years old) 10/07/2013, 10/22/2015    Influenza - Intranasal 10/21/2009, 12/07/2010, 10/13/2011, 09/28/2012    Influenza - Quadrivalent - PF *Preferred* (6 months and older) 10/31/2016    MMR 05/22/2003, 05/18/2006    Meningococcal Conjugate (MCV4P) 10/07/2013    Pneumococcal  "Conjugate - 7 Valent 2002, 02/17/2003, 08/22/2003, 11/24/2003    Tdap 10/07/2013    Varicella 05/22/2003, 10/28/2010           Review of Systems   Constitutional:  Negative for activity change, appetite change, chills, diaphoresis, fatigue and fever.   HENT:  Negative for congestion, ear pain, tinnitus and trouble swallowing.    Eyes:  Negative for visual disturbance.   Respiratory:  Negative for cough, chest tightness, shortness of breath and wheezing.    Cardiovascular:  Negative for chest pain, palpitations and leg swelling.   Gastrointestinal:  Negative for abdominal distention, abdominal pain, blood in stool, constipation, diarrhea, nausea and vomiting.   Endocrine: Negative for cold intolerance, heat intolerance, polydipsia, polyphagia and polyuria.   Genitourinary:  Negative for difficulty urinating, dysuria, frequency, hematuria and urgency.   Musculoskeletal:  Negative for back pain, gait problem and neck pain.   Skin:  Negative for color change and rash.   Allergic/Immunologic: Positive for environmental allergies.   Neurological:  Negative for dizziness, speech difficulty, weakness, light-headedness, numbness and headaches.   Hematological:  Negative for adenopathy. Does not bruise/bleed easily.   Psychiatric/Behavioral:  Negative for behavioral problems, confusion and dysphoric mood. The patient is not nervous/anxious.      Objective:     Vitals:    01/30/24 1310   BP: 132/80   BP Location: Right arm   Patient Position: Sitting   BP Method: Large (Automatic)   Pulse: 88   Resp: 18   Temp: 98.2 °F (36.8 °C)   TempSrc: Oral   SpO2: 98%   Weight: 125.6 kg (277 lb)   Height: 5' 9" (1.753 m)       Physical Exam  Vitals and nursing note reviewed.   Constitutional:       General: He is not in acute distress.     Appearance: Normal appearance. He is not diaphoretic.   HENT:      Head: Normocephalic and atraumatic.      Right Ear: Tympanic membrane normal.      Left Ear: Tympanic membrane normal.      Nose: " Nose normal.      Mouth/Throat:      Mouth: Mucous membranes are moist.      Pharynx: Oropharynx is clear.   Eyes:      General:         Right eye: No discharge.         Left eye: No discharge.      Extraocular Movements: Extraocular movements intact.      Conjunctiva/sclera: Conjunctivae normal.      Pupils: Pupils are equal, round, and reactive to light.   Neck:      Thyroid: No thyromegaly or thyroid tenderness.   Cardiovascular:      Rate and Rhythm: Normal rate and regular rhythm.      Pulses: Normal pulses.      Heart sounds: Normal heart sounds.   Pulmonary:      Effort: Pulmonary effort is normal.      Breath sounds: Normal breath sounds. No stridor. No wheezing or rales.   Abdominal:      General: Bowel sounds are normal. There is no distension.      Palpations: Abdomen is soft.      Tenderness: There is no abdominal tenderness.   Musculoskeletal:         General: No tenderness. Normal range of motion.      Cervical back: Normal range of motion and neck supple.      Right lower leg: No edema.      Left lower leg: No edema.   Lymphadenopathy:      Cervical: No cervical adenopathy.   Skin:     General: Skin is warm and dry.      Capillary Refill: Capillary refill takes less than 2 seconds.      Findings: No rash.   Neurological:      General: No focal deficit present.      Mental Status: He is alert and oriented to person, place, and time.   Psychiatric:         Mood and Affect: Mood and affect normal.         Behavior: Behavior normal. Behavior is cooperative.         No visits with results within 6 Month(s) from this visit.   Latest known visit with results is:   Patient Outreach on 06/26/2023   Component Date Value Ref Range Status    HIV1/HIV2 ANTIBODY 05/08/2023 NEGATIVE  NEGATIVE Final         Assessment:      1. Annual physical exam    2. Seasonal allergies    3. Other hyperlipidemia    4. Diabetes mellitus screening    5. Screening cholesterol level    6. Thyroid disorder screen    7. Urine frequency     8. Vitamin D deficiency    9. Class 3 severe obesity due to excess calories without serious comorbidity with body mass index (BMI) of 40.0 to 44.9 in adult          Plan:     Annual physical exam  Comments:  Will review labs and determine POC based on results  Orders:  -     CBC Auto Differential; Future; Expected date: 01/30/2024  -     Comprehensive Metabolic Panel; Future; Expected date: 01/30/2024  -     Hemoglobin A1C; Future; Expected date: 01/30/2024  -     Lipid Panel; Future; Expected date: 01/30/2024  -     TSH; Future; Expected date: 01/30/2024    Seasonal allergies  Comments:  Start singulair nightly  Orders:  -     montelukast (SINGULAIR) 10 mg tablet; Take 1 tablet (10 mg total) by mouth every evening.  Dispense: 30 tablet; Refill: 5    Other hyperlipidemia  Comments:  Eat a low cholesterol diet and exercise regularly    Diabetes mellitus screening  -     Hemoglobin A1C; Future; Expected date: 01/30/2024    Screening cholesterol level  -     Lipid Panel; Future; Expected date: 01/30/2024    Thyroid disorder screen  -     T4, Free; Future; Expected date: 01/30/2024  -     TSH; Future; Expected date: 01/30/2024    Urine frequency  -     Urinalysis, Reflex to Urine Culture Urine, Clean Catch; Future; Expected date: 01/30/2024    Vitamin D deficiency  -     Vitamin D; Future; Expected date: 01/30/2024    Class 3 severe obesity due to excess calories without serious comorbidity with body mass index (BMI) of 40.0 to 44.9 in adult  Comments:  Eat a low cholesterol diet and exercise regularly         Current Outpatient Medications   Medication Sig Dispense Refill    montelukast (SINGULAIR) 10 mg tablet Take 1 tablet (10 mg total) by mouth every evening. 30 tablet 5     No current facility-administered medications for this visit.       Medications Discontinued During This Encounter   Medication Reason    promethazine-dextromethorphan (PROMETHAZINE-DM) 6.25-15 mg/5 mL Syrp Patient no longer taking    meloxicam  (MOBIC) 15 MG tablet Patient no longer taking       Health Maintenance   Topic Date Due    Hepatitis C Screening  Never done    HPV Vaccines (1 - Male 2-dose series) Never done    TETANUS VACCINE  10/07/2023    Lipid Panel  Completed       Patient Instructions   RTC in 6 months for F/U or sooner if needed.    Patient Education       Yearly Physical for Adults   About this topic   Most people do not want to be sick. Having a checkup each year with your doctor is one way to help you stay healthy. You may need to see your doctor more or less often. How often you need to go to the doctor depends on your age. Your family and medical history also play a role in how often you need to go to the doctor. Going to see your doctor on a routine basis can help you find problems early or even before they start. This may make it easier to treat or cure your problem.  General   Your doctor will talk about many things during your checkup. Your doctor may ask about:  Your medical and family history.  All the drugs you are taking. Be sure to include all prescription, over the counter, and herbal supplements. Tell the doctor if you have any drug allergy. Bring a list of drugs you take with you.  How you are feeling and if you are having any problems.  Risky behaviors like smoking, drinking alcohol, using illegal drugs, not wearing seatbelts, having unprotected sex, etc.  Your doctor will do a physical exam and may check your:  Height and weight  Blood pressure  Reflexes  Memory  Vision  Hearing  Your doctor may order:  Lab tests  ECG to check your heart rhythm  X-rays  Tests or treatments based on your exam  What lifestyle changes are needed?   Your doctor may suggest you make changes to your lifestyle at this visit. The doctor may talk with you about being more active or lowering stress levels. Ask your doctor what you need to do.  What drugs may be needed?   Your doctor may order drugs or vaccines to protect you from illnesses.  What  changes to diet are needed?   Talk to your doctor to see if any changes are needed to your diet.  When do I need to call the doctor?   Call your doctor if you need to learn about any test results. Together you can make a plan for more care.  Helpful tips   Make a list of questions for your doctor before you go. This will help you remember to ask about any concerns. Write down any answers from your doctor so you can look over them after your visit.   Tell your doctor about any changes in your body or health since your last visit.  Ask your doctor about any screening tests you need.  Where can I learn more?   American Academy of [employer]icians  http://familydoctor.org/familydoctor/en/prevention-wellness/staying-healthy/healthy-living/preventive-services-for-healthy-living.printerview.html   Centers for Disease Control  http://www.cdc.gov/family/checkup/   Last Reviewed Date   2019-04-22  Consumer Information Use and Disclaimer   This information is not specific medical advice and does not replace information you receive from your health care provider. This is only a brief summary of general information. It does NOT include all information about conditions, illnesses, injuries, tests, procedures, treatments, therapies, discharge instructions or life-style choices that may apply to you. You must talk with your health care provider for complete information about your health and treatment options. This information should not be used to decide whether or not to accept your health care providers advice, instructions or recommendations. Only your health care provider has the knowledge and training to provide advice that is right for you.  Copyright   Copyright © 2021 UpToDate, Inc. and its affiliates and/or licensors. All rights reserved.      Patient Education       Low Cholesterol, Saturated Fat, and Trans Fat Diet   About this topic   Cholesterol, saturated fat, and trans fat are in many foods. These may raise your  "blood cholesterol levels. If your cholesterol is too high, this can cause health problems in your heart, liver, kidneys, and even your eyes. The key to lowering your risk of heart problems is to lower your bad fat intake.  Saturated fats and trans fats are the bad fats. These fats clog your arteries and raise your bad cholesterol. Saturated fats and trans fats are solid fats at room temperature. Saturated fats are animal fats. Trans fats are manmade fats. They add flavor to a lot of packaged foods. Staying away from saturated and trans fats will help your heart.  When you do eat foods with fat, make sure they have the good fats. Monounsaturated and polyunsaturated fats are good fats. These fats help raise your good cholesterol and protect your heart.  General   How to Lower Fat and Cholesterol in Your Diet   Read the labels of the foods you buy from the market to find out how much fat is present. Under 5% of total fat on a label means it is "low fat". Over 20% of total fat on a label means it is high fat.  Eat high fiber foods, like soluble fiber. This type of fiber helps lower cholesterol in the body. Choose oatmeal, fruits (like apples), beans, and nuts to get the most soluble fiber.  Eat foods high in omega-3 fatty acids like gurvinder seeds, walnuts, salmon, tuna, trout, herring, flaxseed, and soybeans. These foods help keep the heart healthy.  Limit your bad fat and oil intake.  Stay away from butter, stick margarine, shortening, lard, and palm and coconut oil. Pick plant-based spreads instead.  Limit mayonnaise, salad dressings, gravies, and sauces, unless it is made from low-fat ingredients.  Limit chocolate.  Do not eat high-fat processed foods like hot dogs, julian, sausage, ham and other luncheon meats high in fat, and some frozen foods. Pick fish, chicken, turkey, and lean meats instead.  Eat more dried beans, lentils, and tofu to get your protein.  Do not eat organ meats, like liver.  Choose nonfat or " low-fat milk, yogurt, and cheese.  Use light or fat-free cream cheese and sour cream.  Eat lots of fruits and vegetables.  Pick whole grain breads, cereals, pastas, and rice.  Do not eat snacks that are high in fats like granola, cookies, pies, pastries, doughnuts, and croissants.  Stay away from deep fried foods.  Help When Cooking   Remove the fat portion of meats and the skin from poultry before cooking.  Bake, broil, grill, poach, or roast poultry, fish, and lean meats.  Drain and throw away the fat that drains out of meat as you cook it.  Try to add little or no fat to foods.  Use olive or canola oil for cooking or baking.  Steam your vegetables.  Use herbs or no-oil marinades to flavor foods.         Who should use this diet?   This diet is for people who are at high risk of getting health problems like heart disease, high blood pressure, diabetes, and others. This diet is also good for all people to follow to keep your heart healthy.  What foods are good to eat?   Foods with good fats are:  Canola, peanut, and olive oil  Safflower, soybean, and corn oil  Walnuts, almonds, cashews, and peanuts  Pumpkin and sunflower seeds  [place] and tuna  Tofu  Soymilk  Avocado  What foods should be limited or avoided?   Stay away from these types of foods that have saturated fats:  Whole fat dairy products like cheese, ice cream, whole milk, and cream  Palm and coconut oils  High-fat meats like beef, lamb, poultry with the skin, julian, and sausage  Butter and lard  Stay away from these types of foods that may have trans fat:  Cookies, cakes, candy, doughnuts, baked goods, muffins, pizza dough, and pie crusts that are packaged  Fried foods  Frozen dinners  Chips and crackers  Microwave popcorn  Stick margarine and vegetable shortenings  Helpful tips   To help stay away from saturated fat:  Pick lean cuts of meat  Take the skin off chicken and turkey or pick skinless  Pick low-fat cheese, milk, and ice cream  Use liquid oils  when cooking and baking, such as olive oil and canola oil  To help stay away from trans fat:  Look at your labels. Choose foods with 0% trans fat. Read the ingredient list. Avoid foods with partially hydrogenated oil in the ingredient list. This means there is trans fat in the product.  Where can I learn more?   American Heart Association   http://www.heart.org/HEARTORG/Conditions/Cholesterol/PreventionTreatmentofHighCholesterol/Know-Your-Fats_[facility]_305628_Article.jsp   Last Reviewed Date   2021-10-05  Consumer Information Use and Disclaimer   This information is not specific medical advice and does not replace information you receive from your health care provider. This is only a brief summary of general information. It does NOT include all information about conditions, illnesses, injuries, tests, procedures, treatments, therapies, discharge instructions or life-style choices that may apply to you. You must talk with your health care provider for complete information about your health and treatment options. This information should not be used to decide whether or not to accept your health care providers advice, instructions or recommendations. Only your health care provider has the knowledge and training to provide advice that is right for you.   Copyright   Copyright © 2021 UpToDate, Inc. and its affiliates and/or licensors. All rights reserved.    Patient Education       Seasonal Allergies Discharge Instructions   About this topic   Seasonal allergies are also called allergic rhinitis or hay fever. You may have sneezing; a stuffy or runny nose; or itchy throat, ears, or eyes. You may feel very tired. Most often, this problem is caused by:  Pollens from trees, grasses, or weeds.  Mold spores that grow when the air is humid, wet, or damp.  Some people can breathe in these things and have no problems. But when you have a seasonal allergy, your body acts as if the substance is harming you. Then you have symptoms. You  may have symptoms only at some times of the year. If your symptoms last all year, they may be caused by:  Insects, such as dust mites or cock roaches.  Animals, such as cats or dogs.  Mold spores.     What care is needed at home?   Ask your doctor what you need to do when you go home. Make sure you ask questions if you do not understand what the doctor says.  Rinse your nose with salt water to get rid of pollen inside of your nose.  Keep the windows closed and use air conditioning.  Shower before bed to rinse pollen from your hair and skin.  Wear a dust mask if you need to be outside.  Use over-the-counter medicines to help with your symptoms:  A steroid nose spray can help with a stuffy nose. It can also help with drainage down the back of your throat.  An antihistamine can help with itching, sneezing, or runny nose.  An antihistamine eye drop can help with itchy eyes.  A decongestant can help with a stuffy nose. Talk with your doctor or pharmacist about your other health problems before you use this kind of medicine. It may not be safe for people with high blood pressure.  What follow-up care is needed?   Your doctor may ask you to make visits to the office to check on your progress. Your doctor may ask you to see an allergy specialist, or to have testing done to learn what is causing your allergies. Be sure to keep these visits.  What drugs may be needed?   The doctor may order drugs to treat the signs. Take your drugs as ordered. You may also take allergy shots if you have had allergy tests.  Will physical activity be limited?   You may have to limit your activity. If your health problem is caused by an allergy to pollens or molds, you may want to limit your time outdoors. Talk to your doctor about what activities are best for you.  What problems could happen?   Your signs may not get better with your drugs  Asthma may get worse  Sinus infection  What can be done to prevent this health problem?   Try to avoid  allergens.  If you are allergic to pollens, grasses, trees, etc:  Stay inside in the morning when pollen counts are high.  Avoid going outside when the trees, grasses, or weeds are blooming.  Keep the windows of your house and car closed.  Use an air conditioner.  If you are allergic to dust, molds, dust mites, pets, etc:  Clean your air conditioner or furnace filters regularly.  Cover pillows and mattresses with vinyl covers to lower your exposure to dust mites.  Wash bedding weekly in very hot water.  Use fewer dust-collecting items, such as curtains, bed skirts, carpeting, and stuffed animals, mainly in your bedroom.  If you can't avoid having a furry pet, vacuum often and keep your pet out of bedrooms and other rooms with carpets.  When do I need to call the doctor?   You are having so much trouble breathing that you can only say one or two words at a time.  You need to sit upright at all times to be able to breathe and or cannot lie down.  You have severe swelling of your tongue, lips, or mouth.  You have trouble breathing when talking or sitting still.  You have a fever of 100.4°F (38°C) or higher.  You have green or yellow mucus.  Teach Back: Helping You Understand   The Teach Back Method helps you understand the information we are giving you. After you talk with the staff, tell them in your own words what you learned. This helps to make sure the staff has described each thing clearly. It also helps to explain things that may have been confusing. Before going home, make sure you can do these:  I can tell you about my condition.  I can tell you what may help make the air .  I can tell you what may help ease my allergies.  Where can I learn more?   Food and Drug Administration  https://www.fda.gov/ForConsumers/ConsumerUpdates/hof782465.htm   NHS Choices  https://www.nhs.uk/conditions/Hay-fever/   Last Reviewed Date   2021-06-21  Consumer Information Use and Disclaimer   This information is not specific  medical advice and does not replace information you receive from your health care provider. This is only a brief summary of general information. It does NOT include all information about conditions, illnesses, injuries, tests, procedures, treatments, therapies, discharge instructions or life-style choices that may apply to you. You must talk with your health care provider for complete information about your health and treatment options. This information should not be used to decide whether or not to accept your health care providers advice, instructions or recommendations. Only your health care provider has the knowledge and training to provide advice that is right for you.  Copyright   Copyright © 2021 UpToDate, Inc. and its affiliates and/or licensors. All rights reserved.          Risks, benefits, and alternatives discussed with patient, Patient verbalized understanding of discussed plan of care. Asked patient if any further questions, answered no.    Future Appointments   Date Time Provider Department Center   7/30/2024  8:00 AM Malia Galo NP [facility]              Malia Galo NP

## 2025-01-23 DIAGNOSIS — J30.2 SEASONAL ALLERGIES: ICD-10-CM

## 2025-01-24 RX ORDER — MONTELUKAST SODIUM 10 MG/1
10 TABLET ORAL NIGHTLY
Qty: 30 TABLET | Refills: 5 | Status: SHIPPED | OUTPATIENT
Start: 2025-01-24

## 2025-03-30 ENCOUNTER — TELEPHONE (OUTPATIENT)
Dept: PRIMARY CARE CLINIC | Facility: CLINIC | Age: 25
End: 2025-03-30
Payer: COMMERCIAL

## 2025-03-30 DIAGNOSIS — Z13.220 SCREENING CHOLESTEROL LEVEL: ICD-10-CM

## 2025-03-30 DIAGNOSIS — Z00.00 ANNUAL PHYSICAL EXAM: ICD-10-CM

## 2025-03-30 DIAGNOSIS — Z11.59 NEED FOR HEPATITIS C SCREENING TEST: ICD-10-CM

## 2025-03-30 DIAGNOSIS — Z13.29 THYROID DISORDER SCREEN: ICD-10-CM

## 2025-03-30 DIAGNOSIS — E55.9 VITAMIN D DEFICIENCY: ICD-10-CM

## 2025-03-30 DIAGNOSIS — E78.49 OTHER HYPERLIPIDEMIA: Primary | ICD-10-CM

## 2025-03-30 DIAGNOSIS — R35.0 URINE FREQUENCY: ICD-10-CM

## 2025-03-30 DIAGNOSIS — Z13.1 DIABETES MELLITUS SCREENING: ICD-10-CM

## 2025-03-30 NOTE — TELEPHONE ENCOUNTER
----- Message from Med Assistant Mcmahon sent at 3/27/2025 11:15 AM CDT -----  Contact: MARISELA MARY [[id number]]  Pt would like labs ordered before his upcoming appt in July.  ----- Message -----  From: Janay Martinez  Sent: 3/27/2025  10:47 AM CDT  To: [facility] WORLEY Staff    ...Type:  Patient Requesting OrdersWho Called: MARISELA MARY [[id number]]What order(s)is the call regarding?: would like blood work orders to be sent to the lab in office close to the appt date 7/1.Would the patient rather a call back or a response via [facility]?  [employer] Call Back Number: [phone] (home) Additional Information:

## 2025-04-01 ENCOUNTER — PATIENT MESSAGE (OUTPATIENT)
Dept: PRIMARY CARE CLINIC | Facility: CLINIC | Age: 25
End: 2025-04-01
Payer: COMMERCIAL

## 2025-06-23 ENCOUNTER — CLINICAL SUPPORT (OUTPATIENT)
Dept: OBSTETRICS AND GYNECOLOGY | Facility: CLINIC | Age: 25
End: 2025-06-23
Payer: COMMERCIAL

## 2025-06-23 DIAGNOSIS — Z00.00 ANNUAL PHYSICAL EXAM: Primary | ICD-10-CM

## 2025-06-26 RX ORDER — CHLORHEXIDINE GLUCONATE ORAL RINSE 1.2 MG/ML
SOLUTION DENTAL
COMMUNITY
Start: 2025-01-27 | End: 2025-07-01

## 2025-06-26 RX ORDER — MELOXICAM 15 MG/1
15 TABLET ORAL
COMMUNITY
Start: 2025-05-08

## 2025-06-27 DIAGNOSIS — J30.2 SEASONAL ALLERGIES: ICD-10-CM

## 2025-06-27 RX ORDER — MONTELUKAST SODIUM 10 MG/1
10 TABLET ORAL NIGHTLY
Qty: 30 TABLET | Refills: 5 | Status: SHIPPED | OUTPATIENT
Start: 2025-06-27

## 2025-07-01 ENCOUNTER — OFFICE VISIT (OUTPATIENT)
Dept: PRIMARY CARE CLINIC | Facility: CLINIC | Age: 25
End: 2025-07-01
Payer: COMMERCIAL

## 2025-07-01 VITALS
RESPIRATION RATE: 18 BRPM | DIASTOLIC BLOOD PRESSURE: 82 MMHG | HEART RATE: 103 BPM | WEIGHT: 263.81 LBS | HEIGHT: 69 IN | BODY MASS INDEX: 39.07 KG/M2 | OXYGEN SATURATION: 98 % | SYSTOLIC BLOOD PRESSURE: 128 MMHG

## 2025-07-01 DIAGNOSIS — B35.0 TINEA CAPITIS: ICD-10-CM

## 2025-07-01 DIAGNOSIS — Q85.00: ICD-10-CM

## 2025-07-01 DIAGNOSIS — B36.9 SKIN DISEASE, FUNGAL: ICD-10-CM

## 2025-07-01 DIAGNOSIS — Z00.00 ANNUAL PHYSICAL EXAM: Primary | ICD-10-CM

## 2025-07-01 DIAGNOSIS — J30.2 SEASONAL ALLERGIES: ICD-10-CM

## 2025-07-01 DIAGNOSIS — E55.9 VITAMIN D DEFICIENCY: ICD-10-CM

## 2025-07-01 DIAGNOSIS — E78.49 OTHER HYPERLIPIDEMIA: ICD-10-CM

## 2025-07-01 DIAGNOSIS — B96.89 BACTERIAL SKIN INFECTION: ICD-10-CM

## 2025-07-01 DIAGNOSIS — L08.9 BACTERIAL SKIN INFECTION: ICD-10-CM

## 2025-07-01 PROCEDURE — 1159F MED LIST DOCD IN RCRD: CPT | Mod: CPTII,,, | Performed by: NURSE PRACTITIONER

## 2025-07-01 PROCEDURE — 1160F RVW MEDS BY RX/DR IN RCRD: CPT | Mod: CPTII,,, | Performed by: NURSE PRACTITIONER

## 2025-07-01 PROCEDURE — 3044F HG A1C LEVEL LT 7.0%: CPT | Mod: CPTII,,, | Performed by: NURSE PRACTITIONER

## 2025-07-01 PROCEDURE — 3074F SYST BP LT 130 MM HG: CPT | Mod: CPTII,,, | Performed by: NURSE PRACTITIONER

## 2025-07-01 PROCEDURE — 3008F BODY MASS INDEX DOCD: CPT | Mod: CPTII,,, | Performed by: NURSE PRACTITIONER

## 2025-07-01 PROCEDURE — 3079F DIAST BP 80-89 MM HG: CPT | Mod: CPTII,,, | Performed by: NURSE PRACTITIONER

## 2025-07-01 PROCEDURE — 99395 PREV VISIT EST AGE 18-39: CPT | Mod: S$PBB,,, | Performed by: NURSE PRACTITIONER

## 2025-07-01 RX ORDER — ASPIRIN 325 MG
50000 TABLET, DELAYED RELEASE (ENTERIC COATED) ORAL WEEKLY
Qty: 8 CAPSULE | Refills: 0 | Status: SHIPPED | OUTPATIENT
Start: 2025-07-01 | End: 2025-08-20

## 2025-07-01 RX ORDER — DOXYCYCLINE HYCLATE 100 MG
100 TABLET ORAL 2 TIMES DAILY
Qty: 14 TABLET | Refills: 0 | Status: SHIPPED | OUTPATIENT
Start: 2025-07-01 | End: 2025-07-08

## 2025-07-01 RX ORDER — CLOBETASOL PROPIONATE 0.5 MG/G
CREAM TOPICAL 2 TIMES DAILY
Qty: 60 G | Refills: 1 | Status: SHIPPED | OUTPATIENT
Start: 2025-07-01

## 2025-07-01 RX ORDER — CLOBETASOL PROPIONATE 0.05 G/100ML
SHAMPOO TOPICAL
Qty: 118 ML | Refills: 2 | Status: SHIPPED | OUTPATIENT
Start: 2025-07-03

## 2025-07-01 NOTE — PROGRESS NOTES
Subjective:       Patient ID: Jose Antonio Jeter is a 25 y.o. male.    Chief Complaint: Annual Exam    HPI: Jose Antonio is a 25 y.o. male who presents for annual visit. Mother present during visit.    C/O rash to both arms which he believes it is a new detergent he has been using to wash his clothes. Also, with flaky scalp.    Annual labs recently drawn and his cholesterol is elevated. All other lab results are within normal range.    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 Lebron but he has been stable since the age of 7. He also has neurofibromatosis.    No other issues or concerns today.    Denies smoking or drinking.                History reviewed. No pertinent past medical history.    History reviewed. No pertinent surgical history.    Family History   Problem Relation Name Age of Onset    Asthma Mother Marycruz fernandes     Birth defects Mother Marycruz fernandes     Heart disease Maternal Grandmother Alexsandra fernandes     Arthritis Maternal Uncle Emmett fernandes        Social History[1]    Problem List[2]    Immunization History   Administered Date(s) Administered    COVID-19, MRNA, LN-S, PF (Pfizer) (Purple Cap) 07/31/2021, 08/01/2021, 08/21/2021, 08/31/2021    DTaP 2002, 2002, 02/17/2003, 08/22/2003, 05/18/2006    HIB 2002, 02/17/2003, 05/22/2003    Hep B / HiB 2002, 2002, 05/22/2003    IPV 2002, 2002, 05/22/2003, 05/18/2006    Influenza (Flumist) - Quadrivalent - Intranasal *Preferred* (2-49 years old) 10/07/2013, 10/22/2015    Influenza - Intranasal 10/21/2009, 12/07/2010, 10/13/2011, 09/28/2012    Influenza - Quadrivalent - PF *Preferred* (6 months and older) 10/31/2016    MMR 05/22/2003, 05/18/2006    Meningococcal Conjugate (MCV4P) 10/07/2013    Pneumococcal Conjugate - 7 Valent 2002, 02/17/2003, 08/22/2003, 11/24/2003    Tdap 10/07/2013    Varicella 05/22/2003, 10/28/2010           Review of Systems   Constitutional:  Negative  "for activity change, appetite change, chills, diaphoresis, fatigue and fever.   HENT:  Negative for congestion, ear pain, tinnitus and trouble swallowing.    Eyes:  Negative for visual disturbance.   Respiratory:  Negative for cough, chest tightness, shortness of breath and wheezing.    Cardiovascular:  Negative for chest pain, palpitations and leg swelling.   Gastrointestinal:  Negative for abdominal distention, abdominal pain, blood in stool, constipation, diarrhea, nausea and vomiting.   Endocrine: Negative for cold intolerance, heat intolerance, polydipsia, polyphagia and polyuria.   Genitourinary:  Negative for difficulty urinating, dysuria, frequency, hematuria and urgency.   Musculoskeletal:  Negative for back pain, gait problem and neck pain.   Skin:  Positive for rash (both arms). Negative for color change.   Allergic/Immunologic: Positive for environmental allergies.   Neurological:  Negative for dizziness, speech difficulty, weakness, light-headedness, numbness and headaches.   Hematological:  Negative for adenopathy. Does not bruise/bleed easily.   Psychiatric/Behavioral:  Negative for behavioral problems, confusion and dysphoric mood. The patient is not nervous/anxious.      Objective:     Vitals:    07/01/25 1129   BP: 128/82   BP Location: Right arm   Patient Position: Sitting   Pulse: 103   Resp: 18   SpO2: 98%   Weight: 119.7 kg (263 lb 12.8 oz)   Height: 5' 9" (1.753 m)       Physical Exam  Vitals and nursing note reviewed.   Constitutional:       General: He is not in acute distress.     Appearance: Normal appearance. He is not diaphoretic.   HENT:      Head: Normocephalic and atraumatic.      Right Ear: Tympanic membrane normal.      Left Ear: Tympanic membrane normal.      Nose: Nose normal.      Mouth/Throat:      Mouth: Mucous membranes are moist.      Pharynx: Oropharynx is clear.   Eyes:      General:         Right eye: No discharge.         Left eye: No discharge.      Extraocular " Movements: Extraocular movements intact.      Conjunctiva/sclera: Conjunctivae normal.      Pupils: Pupils are equal, round, and reactive to light.   Neck:      Thyroid: No thyromegaly or thyroid tenderness.   Cardiovascular:      Rate and Rhythm: Normal rate and regular rhythm.      Pulses: Normal pulses.      Heart sounds: Normal heart sounds.   Pulmonary:      Effort: Pulmonary effort is normal.      Breath sounds: Normal breath sounds. No stridor. No wheezing or rales.   Abdominal:      General: Bowel sounds are normal. There is no distension.      Palpations: Abdomen is soft.      Tenderness: There is no abdominal tenderness. There is no guarding.   Musculoskeletal:         General: No tenderness. Normal range of motion.      Cervical back: Normal range of motion.      Right lower leg: No edema.      Left lower leg: No edema.   Lymphadenopathy:      Cervical: No cervical adenopathy.   Skin:     General: Skin is warm and dry.      Capillary Refill: Capillary refill takes less than 2 seconds.      Findings: Rash (arms) present.   Neurological:      General: No focal deficit present.      Mental Status: He is alert and oriented to person, place, and time.   Psychiatric:         Mood and Affect: Mood and affect normal.         Behavior: Behavior normal. Behavior is cooperative.         Thought Content: Thought content normal.         Judgment: Judgment normal.         Telephone on 03/30/2025   Component Date Value Ref Range Status    WBC 06/23/2025 4.32  4.3 - 10.8 X 10 3/ul Final    RBC 06/23/2025 4.82  4.7 - 6.1 X 10 6/ul Final    RDW-SD 06/23/2025 43.2  37 - 54 fl Final    Hemoglobin 06/23/2025 14.6  14 - 18 g/dL Final    Hematocrit 06/23/2025 44.6  42 - 52 % Final    MCV 06/23/2025 92.5  80 - 94 fl Final    MCH 06/23/2025 30.3  27 - 32 pg Final    MCHC 06/23/2025 32.7  32 - 36 g/dL Final    Platelets 06/23/2025 234  135 - 400 X 10 3/ul Final    Neutrophils 06/23/2025 49.8  34 - 71.1 % Final    Lymphocytes  06/23/2025 38.9  19.3 - 53.1 % Final    Monocytes 06/23/2025 8.3  4.7 - 12.5 % Final    Eosinophils 06/23/2025 2.3  0.7 - 7.0 % Final    Basophils 06/23/2025 0.5  0.2 - 1.2 % Final    Neutrophils Absolute 06/23/2025 2.15  2.15 - 7.56 X 10 3/ul Final    Lymphocytes Absolute 06/23/2025 1.68  0.86 - 4.75 X 10 3/ul Final    Monocytes Absolute 06/23/2025 0.36  0.22 - 1.08 X 10 3/ul Final    Eosinophils Absolute 06/23/2025 0.10  0.04 - 0.54 X 10 3/ul Final    Basophils Absolute 06/23/2025 0.02  0.00 - 0.22 X 10 3/ul Final    Immature Granulocytes Absolute 06/23/2025 0.01  0 - 0.04 X 10 3/ul Final    Immature Granulocytes 06/23/2025 0.2  0 - 0.5 % Final    nRBC# 06/23/2025 0.0  0 - 0.2 /100 WBC Final    nRBC Count Absolute 06/23/2025 0.000  0 - 0.012 x 10 3/ul Final    Glucose 06/23/2025 83  74 - 106 mg/dL Final    BUN 06/23/2025 13.1  6 - 20 mg/dL Final    Creatinine 06/23/2025 0.76  0.70 - 1.20 mg/dL Final    AST 06/23/2025 24  0 - 40 U/L Final    ALT (SGPT) 06/23/2025 21  0 - 41 U/L Final    Alkaline Phosphatase 06/23/2025 57  40 - 130 U/L Final    Calcium 06/23/2025 9.7  8.6 - 10.2 mg/dL Final    Protein, Total 06/23/2025 7.8  6.4 - 8.3 g/dL Final    Albumin 06/23/2025 4.5  3.5 - 5.2 g/dL Final    BILIRUBIN, TOTAL 06/23/2025 0.78  0.00 - 1.20 mg/dL Final    Sodium 06/23/2025 137  136 - 145 mmol/L Final    Potassium 06/23/2025 3.6  3.5 - 5.1 mmol/L Final    Chloride 06/23/2025 100  98 - 107 mmol/L Final    CO2 06/23/2025 23  22 - 29 mmol/L Final    Globulin 06/23/2025 3.3  1.5 - 4.5 g/dL Final    Albumin/Globulin Ratio 06/23/2025 1.4  1.0 - 2.7 Final    BUN/Creatinine Ratio 06/23/2025 17.2  6 - 20 Final    GFR ESTIMATION 06/23/2025 129.52  >60.00 mL/min/1.73m2 Final    Anion Gap 06/23/2025 14.0  8.0 - 17.0 mmol/L Final    Hemoglobin A1C 06/23/2025 5.3  4.0 - 5.6 % Final    EST AVERAGE GLUCOSE 06/23/2025 106  70 - 126 MG/DL Final    Cholesterol 06/23/2025 226 (H)  100 - 200 mg/dL Final    Triglycerides 06/23/2025 37  0 -  150 mg/dL Final    HDL 06/23/2025 62  >60 mg/dL Final    LDL Cholesterol 06/23/2025 Test Not Performed   Final    LDL/HDL Ratio 06/23/2025 Test Not Performed   Final    TSH 06/23/2025 1.10  0.27 - 4.20 uIU/mL Final    VITAMIN D (25OHD) 06/23/2025 23.6 (L)  >30 ng/mL Final    HCV Ab 06/23/2025 NONREACTIVE  NONREACTIVE Final    Color, UA 06/23/2025 YELLOW   Final    Clarity, UA 06/23/2025 HAZY   Final    Specific Gravity,UA 06/23/2025 1.015  1.005 - 1.030 Final    pH, Urine 06/23/2025 6.5  5 - 7.5 Final    Leukocytes, UA 06/23/2025 NEGATIVE  NEGATIVE Final    Nitrite, Urine 06/23/2025 NEGATIVE  NEGATIVE Final    Protein, UA 06/23/2025 NEGATIVE  NEGATIVE mg/dL Final    Glucose, UA 06/23/2025 NEGATIVE  NEGATIVE mg/dL Final    Ketones, UA 06/23/2025 NEGATIVE  NEGATIVE mg/dL Final    Urobilinogen, urine 06/23/2025 NORMAL  0 - 1.0 E.U./dL Final    Bilirubin (UA) 06/23/2025 NEGATIVE  NEGATIVE Final    Occult Blood 06/23/2025 NEGATIVE  NEGATIVE Final    WBC/HPF 06/23/2025 NEGATIVE  <5 Final    RBC/HPF 06/23/2025 NEGATIVE  <5 Final    Amorphous, UA 06/23/2025 1+   Final    Bacteria, UA 06/23/2025 1+ (A)  NEG-TRACE Final    Epithelial Cells 06/23/2025 FEW  NEGATIVE-FEW Final    Mucus, UA 06/23/2025 1+ (A)  NEGATIVE Final         Assessment:      1. Annual physical exam    2. Seasonal allergies    3. Other hyperlipidemia    4. Vitamin D deficiency    5. History of neurofibromatosis    6. Bacterial skin infection    7. Skin disease, fungal    8. Tinea capitis          Plan:     Annual physical exam  Comments:  Lab results reviewed and discussed in detail with Patient's full understanding.    Seasonal allergies    Other hyperlipidemia  Comments:  Eat a low cholesterol diet and exercise regularly    Vitamin D deficiency  -     cholecalciferol, vitamin D3, (DECARA) 1,250 mcg (50,000 unit) capsule; Take 1 capsule (50,000 Units total) by mouth once a week. for 8 doses  Dispense: 8 capsule; Refill: 0    History of  neurofibromatosis  Comments:  stable    Bacterial skin infection  -     doxycycline (VIBRA-TABS) 100 MG tablet; Take 1 tablet (100 mg total) by mouth 2 (two) times daily. for 7 days  Dispense: 14 tablet; Refill: 0    Skin disease, fungal  -     clobetasoL (TEMOVATE) 0.05 % cream; Apply topically 2 (two) times daily.  Dispense: 60 g; Refill: 1    Tinea capitis  -     clobetasoL (CLOBEX) 0.05 % shampoo; Apply topically twice a week.  Dispense: 118 mL; Refill: 2         Current Medications[3]    Medications Discontinued During This Encounter   Medication Reason    chlorhexidine (PERIDEX) 0.12 % solution Patient no longer taking       Health Maintenance   Topic Date Due    HPV Vaccines (1 - Male 3-dose series) Never done    TETANUS VACCINE  10/07/2023    COVID-19 Vaccine (5 - 2024-25 season) 09/01/2024    Influenza Vaccine (1) 09/01/2025    RSV Vaccine (Age 60+ and Pregnant patients) (1 - 1-dose 75+ series) 05/17/2077    Hepatitis C Screening  Completed    HIV Screening  Completed    Lipid Panel  Completed    Pneumococcal Vaccines (Age 0-49)  Aged Out       Patient Instructions   RTC in 4 months for F/U or sooner if needed.    Patient Education       Yearly Physical for Adults   About this topic   Most people do not want to be sick. Having a checkup each year with your doctor is one way to help you stay healthy. You may need to see your doctor more or less often. How often you need to go to the doctor depends on your age. Your family and medical history also play a role in how often you need to go to the doctor. Going to see your doctor on a routine basis can help you find problems early or even before they start. This may make it easier to treat or cure your problem.  General   Your doctor will talk about many things during your checkup. Your doctor may ask about:  Your medical and family history.  All the drugs you are taking. Be sure to include all prescription, over the counter, and herbal supplements. Tell the  doctor if you have any drug allergy. Bring a list of drugs you take with you.  How you are feeling and if you are having any problems.  Risky behaviors like smoking, drinking alcohol, using illegal drugs, not wearing seatbelts, having unprotected sex, etc.  Your doctor will do a physical exam and may check your:  Height and weight  Blood pressure  Reflexes  Memory  Vision  Hearing  Your doctor may order:  Lab tests  ECG to check your heart rhythm  X-rays  Tests or treatments based on your exam  What lifestyle changes are needed?   Your doctor may suggest you make changes to your lifestyle at this visit. The doctor may talk with you about being more active or lowering stress levels. Ask your doctor what you need to do.  What drugs may be needed?   Your doctor may order drugs or vaccines to protect you from illnesses.  What changes to diet are needed?   Talk to your doctor to see if any changes are needed to your diet.  When do I need to call the doctor?   Call your doctor if you need to learn about any test results. Together you can make a plan for more care.  Helpful tips   Make a list of questions for your doctor before you go. This will help you remember to ask about any concerns. Write down any answers from your doctor so you can look over them after your visit.   Tell your doctor about any changes in your body or health since your last visit.  Ask your doctor about any screening tests you need.  Where can I learn more?   American Academy of Family Physicians  http://familydoctor.org/familydoctor/en/prevention-wellness/staying-healthy/healthy-living/preventive-services-for-healthy-living.printerview.html   Centers for Disease Control  http://www.cdc.gov/family/checkup/   Last Reviewed Date   2019-04-22  Consumer Information Use and Disclaimer   This information is not specific medical advice and does not replace information you receive from your health care provider. This is only a brief summary of general  "information. It does NOT include all information about conditions, illnesses, injuries, tests, procedures, treatments, therapies, discharge instructions or life-style choices that may apply to you. You must talk with your health care provider for complete information about your health and treatment options. This information should not be used to decide whether or not to accept your health care providers advice, instructions or recommendations. Only your health care provider has the knowledge and training to provide advice that is right for you.  Copyright   Copyright © 2021 [employer] and its affiliates and/or licensors. All rights reserved.      Patient Education       Low Cholesterol, Saturated Fat, and Trans Fat Diet   About this topic   Cholesterol, saturated fat, and trans fat are in many foods. These may raise your blood cholesterol levels. If your cholesterol is too high, this can cause health problems in your heart, liver, kidneys, and even your eyes. The key to lowering your risk of heart problems is to lower your bad fat intake.  Saturated fats and trans fats are the bad fats. These fats clog your arteries and raise your bad cholesterol. Saturated fats and trans fats are solid fats at room temperature. Saturated fats are animal fats. Trans fats are manmade fats. They add flavor to a lot of packaged foods. Staying away from saturated and trans fats will help your heart.  When you do eat foods with fat, make sure they have the good fats. Monounsaturated and polyunsaturated fats are good fats. These fats help raise your good cholesterol and protect your heart.  General   How to Lower Fat and Cholesterol in Your Diet   Read the labels of the foods you buy from the market to find out how much fat is present. Under 5% of total fat on a label means it is "low fat". Over 20% of total fat on a label means it is high fat.  Eat high fiber foods, like soluble fiber. This type of fiber helps lower cholesterol in " the body. Choose oatmeal, fruits (like apples), beans, and nuts to get the most soluble fiber.  Eat foods high in omega-3 fatty acids like gurvinder seeds, walnuts, salmon, tuna, trout, herring, flaxseed, and soybeans. These foods help keep the heart healthy.  Limit your bad fat and oil intake.  Stay away from butter, stick margarine, shortening, lard, and palm and coconut oil. Pick plant-based spreads instead.  Limit mayonnaise, salad dressings, gravies, and sauces, unless it is made from low-fat ingredients.  Limit chocolate.  Do not eat high-fat processed foods like hot dogs, julian, sausage, ham and other luncheon meats high in fat, and some frozen foods. Pick fish, chicken, turkey, and lean meats instead.  Eat more dried beans, lentils, and tofu to get your protein.  Do not eat organ meats, like liver.  Choose nonfat or low-fat milk, yogurt, and cheese.  Use light or fat-free cream cheese and sour cream.  Eat lots of fruits and vegetables.  Pick whole grain breads, cereals, pastas, and rice.  Do not eat snacks that are high in fats like granola, cookies, pies, pastries, doughnuts, and croissants.  Stay away from deep fried foods.  Help When Cooking   Remove the fat portion of meats and the skin from poultry before cooking.  Bake, broil, grill, poach, or roast poultry, fish, and lean meats.  Drain and throw away the fat that drains out of meat as you cook it.  Try to add little or no fat to foods.  Use olive or canola oil for cooking or baking.  Steam your vegetables.  Use herbs or no-oil marinades to flavor foods.         Who should use this diet?   This diet is for people who are at high risk of getting health problems like heart disease, high blood pressure, diabetes, and others. This diet is also good for all people to follow to keep your heart healthy.  What foods are good to eat?   Foods with good fats are:  Canola, peanut, and olive oil  Safflower, soybean, and corn oil  Walnuts, almonds, cashews, and  peanuts  Pumpkin and sunflower seeds  [place] and tuna  Tofu  Soymilk  Avocado  What foods should be limited or avoided?   Stay away from these types of foods that have saturated fats:  Whole fat dairy products like cheese, ice cream, whole milk, and cream  Palm and coconut oils  High-fat meats like beef, lamb, poultry with the skin, julian, and sausage  Butter and lard  Stay away from these types of foods that may have trans fat:  Cookies, cakes, candy, doughnuts, baked goods, muffins, pizza dough, and pie crusts that are packaged  Fried foods  Frozen dinners  Chips and crackers  Microwave popcorn  Stick margarine and vegetable shortenings  Helpful tips   To help stay away from saturated fat:  Pick lean cuts of meat  Take the skin off chicken and turkey or pick skinless  Pick low-fat cheese, milk, and ice cream  Use liquid oils when cooking and baking, such as olive oil and canola oil  To help stay away from trans fat:  Look at your labels. Choose foods with 0% trans fat. Read the ingredient list. Avoid foods with partially hydrogenated oil in the ingredient list. This means there is trans fat in the product.  Where can I learn more?   American Heart Association   http://www.heart.org/HEARTORG/Conditions/Cholesterol/PreventionTreatmentofHighCholesterol/Know-Your-Fats_[facility]_305628_Article.jsp   Last Reviewed Date   2021-10-05  Consumer Information Use and Disclaimer   This information is not specific medical advice and does not replace information you receive from your health care provider. This is only a brief summary of general information. It does NOT include all information about conditions, illnesses, injuries, tests, procedures, treatments, therapies, discharge instructions or life-style choices that may apply to you. You must talk with your health care provider for complete information about your health and treatment options. This information should not be used to decide whether or not to accept your health care  providers advice, instructions or recommendations. Only your health care provider has the knowledge and training to provide advice that is right for you.   Copyright   Copyright © 2021 [employer], [employer]. and its affiliates and/or licensors. All rights reserved.    Patient Education       Tinea Capitis Discharge Instructions   About this topic   Tinea capitis is also called ringworm of the scalp. It is caused by a germ. It is not caused by a worm. The infected skin is often shaped like a ring with reddish edges. The center may be flaky, dry, and itchy. You may also have bald patches on your scalp. There may be raised sores with draining pus that cause thick crusty areas on the scalp. You can have ringworm on all parts of your body.  This infection spreads easily from one person to another. You can get it by touching other people or by touching things that they have touched. The germs can also be spread by pets. Toddlers and young children often have this infection. Tinea capitis is treated by drugs taken by mouth and with lotions or shampoos made with certain drugs.  What care is needed at home?   Ask your doctor what you need to do when you go home. Make sure you ask questions if you do not understand what the doctor says. This way you will know what you need to do.  Follow your doctor's directions on how to use any drugs or special shampoos. Make sure to use them properly. Wash your hands before and after using any special shampoos.  Try not to scratch your head.  Wash your hair often.  Wash towels in warm, soapy water after each use.  Soak baig and hair brushes in a solution of half bleach and half water for 1 hour.  Do not share baig, brushes, washcloths, towels, hats, or helmets. This could spread the infection to other people.  What follow-up care is needed?   Your doctor may ask you to make visits to the office to check on your progress. Be sure to keep these visits.   What drugs may be needed?   The doctor may  order drugs to:  Fight an infection  Relieve itching  Will physical activity be limited?   Your physical activities will not be limited.  What problems could happen?   Hair loss  Scars on the head  What can be done to prevent this health problem?   Do not share things like hair brushes, baig, hair pieces, hats, bedding, towels, helmets, or clothing.  Keep your skin, hair, and nails clean and dry.  Avoid touching infected animals. If your pet has areas where fur is missing, be sure to have the animal checked by a .  Teach children to wash hands often.  When do I need to call the doctor?   Signs of infection. These include a fever of 100.4°F (38°C) or higher, chills.  Infected area spreads after treatment  Infected area is red, warm, tender, and swollen, or you get sores that break open  You are not feeling better in 2 to 3 days or you are feeling worse  Teach Back: Helping You Understand   The Teach Back Method helps you understand the information we are giving you. After you talk with the staff, tell them in your own words what you learned. This helps to make sure the staff has described each thing clearly. It also helps to explain things that may have been confusing. Before going home, make sure you can do these:  I can tell you about my condition.  I can tell you how I will care for my skin.  I can tell you what I will do if my infected area is warm, red, tender, swollen, or has sores that break open.  Where can I learn more?   American Academy of Family Physicians  https://familydoctor.org/condition/ringworm/   Center for Disease Control and Prevention  https://www.cdc.gov/fungal/diseases/ringworm/index.html   [facility]HS Choices  http://www.nhs.uk/Conditions/Ringworm/Pages/Symptoms.aspx   Last Reviewed Date   2021-08-16  Consumer Information Use and Disclaimer   This information is not specific medical advice and does not replace information you receive from your health care provider. This is only a brief  summary of general information. It does NOT include all information about conditions, illnesses, injuries, tests, procedures, treatments, therapies, discharge instructions or life-style choices that may apply to you. You must talk with your health care provider for complete information about your health and treatment options. This information should not be used to decide whether or not to accept your health care providers advice, instructions or recommendations. Only your health care provider has the knowledge and training to provide advice that is right for you.  Copyright   Copyright © 2021 [employer] and its affiliates and/or licensors. All rights reserved.      Risks, benefits, and alternatives discussed with patient, Patient verbalized understanding of discussed plan of care. Asked patient if any further questions, answered no.    Future Appointments   Date Time Provider Department Center   1/2/2026  8:20 AM Malia Galo NP [facility]              Malia Galo NP         [1]   Social History  Tobacco Use    Smoking status: Never    Smokeless tobacco: Never   Substance Use Topics    Alcohol use: Never    Drug use: Never   [2]   Patient Active Problem List  Diagnosis    Seasonal allergies    Class 3 severe obesity due to excess calories without serious comorbidity with body mass index (BMI) of 40.0 to 44.9 in adult    Other hyperlipidemia    Chronic foot pain, left    Tendinitis of left foot   [3]   Current Outpatient Medications   Medication Sig Dispense Refill    meloxicam (MOBIC) 15 MG tablet Take 15 mg by mouth.      montelukast (SINGULAIR) 10 mg tablet Take 1 tablet (10 mg total) by mouth every evening. 30 tablet 5    cholecalciferol, vitamin D3, (DECARA) 1,250 mcg (50,000 unit) capsule Take 1 capsule (50,000 Units total) by mouth once a week. for 8 doses 8 capsule 0    clobetasoL (CLOBEX) 0.05 % shampoo Apply topically twice a week. 118 mL 2    clobetasoL (TEMOVATE) 0.05 % cream  Apply topically 2 (two) times daily. 60 g 1     No current facility-administered medications for this visit.

## 2025-07-01 NOTE — PATIENT INSTRUCTIONS
RTC in 4 months for F/U or sooner if needed.    Patient Education       Yearly Physical for Adults   About this topic   Most people do not want to be sick. Having a checkup each year with your doctor is one way to help you stay healthy. You may need to see your doctor more or less often. How often you need to go to the doctor depends on your age. Your family and medical history also play a role in how often you need to go to the doctor. Going to see your doctor on a routine basis can help you find problems early or even before they start. This may make it easier to treat or cure your problem.  General   Your doctor will talk about many things during your checkup. Your doctor may ask about:  Your medical and family history.  All the drugs you are taking. Be sure to include all prescription, over the counter, and herbal supplements. Tell the doctor if you have any drug allergy. Bring a list of drugs you take with you.  How you are feeling and if you are having any problems.  Risky behaviors like smoking, drinking alcohol, using illegal drugs, not wearing seatbelts, having unprotected sex, etc.  Your doctor will do a physical exam and may check your:  Height and weight  Blood pressure  Reflexes  Memory  Vision  Hearing  Your doctor may order:  Lab tests  ECG to check your heart rhythm  X-rays  Tests or treatments based on your exam  What lifestyle changes are needed?   Your doctor may suggest you make changes to your lifestyle at this visit. The doctor may talk with you about being more active or lowering stress levels. Ask your doctor what you need to do.  What drugs may be needed?   Your doctor may order drugs or vaccines to protect you from illnesses.  What changes to diet are needed?   Talk to your doctor to see if any changes are needed to your diet.  When do I need to call the doctor?   Call your doctor if you need to learn about any test results. Together you can make a plan for more care.  Helpful tips   Make a  list of questions for your doctor before you go. This will help you remember to ask about any concerns. Write down any answers from your doctor so you can look over them after your visit.   Tell your doctor about any changes in your body or health since your last visit.  Ask your doctor about any screening tests you need.  Where can I learn more?   American Academy of Family Physicians  http://familydoctor.org/familydoctor/en/prevention-wellness/staying-healthy/healthy-living/preventive-services-for-healthy-living.printerview.html   Centers for Disease Control  http://www.cdc.gov/family/checkup/   Last Reviewed Date   2019-04-22  Consumer Information Use and Disclaimer   This information is not specific medical advice and does not replace information you receive from your health care provider. This is only a brief summary of general information. It does NOT include all information about conditions, illnesses, injuries, tests, procedures, treatments, therapies, discharge instructions or life-style choices that may apply to you. You must talk with your health care provider for complete information about your health and treatment options. This information should not be used to decide whether or not to accept your health care providers advice, instructions or recommendations. Only your health care provider has the knowledge and training to provide advice that is right for you.  Copyright   Copyright © 2021 [employer] and its affiliates and/or licensors. All rights reserved.      Patient Education       Low Cholesterol, Saturated Fat, and Trans Fat Diet   About this topic   Cholesterol, saturated fat, and trans fat are in many foods. These may raise your blood cholesterol levels. If your cholesterol is too high, this can cause health problems in your heart, liver, kidneys, and even your eyes. The key to lowering your risk of heart problems is to lower your bad fat intake.  Saturated fats and trans fats are the bad  "fats. These fats clog your arteries and raise your bad cholesterol. Saturated fats and trans fats are solid fats at room temperature. Saturated fats are animal fats. Trans fats are manmade fats. They add flavor to a lot of packaged foods. Staying away from saturated and trans fats will help your heart.  When you do eat foods with fat, make sure they have the good fats. Monounsaturated and polyunsaturated fats are good fats. These fats help raise your good cholesterol and protect your heart.  General   How to Lower Fat and Cholesterol in Your Diet   Read the labels of the foods you buy from the market to find out how much fat is present. Under 5% of total fat on a label means it is "low fat". Over 20% of total fat on a label means it is high fat.  Eat high fiber foods, like soluble fiber. This type of fiber helps lower cholesterol in the body. Choose oatmeal, fruits (like apples), beans, and nuts to get the most soluble fiber.  Eat foods high in omega-3 fatty acids like gurvinder seeds, walnuts, salmon, tuna, trout, herring, flaxseed, and soybeans. These foods help keep the heart healthy.  Limit your bad fat and oil intake.  Stay away from butter, stick margarine, shortening, lard, and palm and coconut oil. Pick plant-based spreads instead.  Limit mayonnaise, salad dressings, gravies, and sauces, unless it is made from low-fat ingredients.  Limit chocolate.  Do not eat high-fat processed foods like hot dogs, julian, sausage, ham and other luncheon meats high in fat, and some frozen foods. Pick fish, chicken, turkey, and lean meats instead.  Eat more dried beans, lentils, and tofu to get your protein.  Do not eat organ meats, like liver.  Choose nonfat or low-fat milk, yogurt, and cheese.  Use light or fat-free cream cheese and sour cream.  Eat lots of fruits and vegetables.  Pick whole grain breads, cereals, pastas, and rice.  Do not eat snacks that are high in fats like granola, cookies, pies, pastries, doughnuts, and " croissants.  Stay away from deep fried foods.  Help When Cooking   Remove the fat portion of meats and the skin from poultry before cooking.  Bake, broil, grill, poach, or roast poultry, fish, and lean meats.  Drain and throw away the fat that drains out of meat as you cook it.  Try to add little or no fat to foods.  Use olive or canola oil for cooking or baking.  Steam your vegetables.  Use herbs or no-oil marinades to flavor foods.         Who should use this diet?   This diet is for people who are at high risk of getting health problems like heart disease, high blood pressure, diabetes, and others. This diet is also good for all people to follow to keep your heart healthy.  What foods are good to eat?   Foods with good fats are:  Canola, peanut, and olive oil  Safflower, soybean, and corn oil  Walnuts, almonds, cashews, and peanuts  Pumpkin and sunflower seeds  [place] and tuna  Tofu  Soymilk  Avocado  What foods should be limited or avoided?   Stay away from these types of foods that have saturated fats:  Whole fat dairy products like cheese, ice cream, whole milk, and cream  Palm and coconut oils  High-fat meats like beef, lamb, poultry with the skin, julian, and sausage  Butter and lard  Stay away from these types of foods that may have trans fat:  Cookies, cakes, candy, doughnuts, baked goods, muffins, pizza dough, and pie crusts that are packaged  Fried foods  Frozen dinners  Chips and crackers  Microwave popcorn  Stick margarine and vegetable shortenings  Helpful tips   To help stay away from saturated fat:  Pick lean cuts of meat  Take the skin off chicken and turkey or pick skinless  Pick low-fat cheese, milk, and ice cream  Use liquid oils when cooking and baking, such as olive oil and canola oil  To help stay away from trans fat:  Look at your labels. Choose foods with 0% trans fat. Read the ingredient list. Avoid foods with partially hydrogenated oil in the ingredient list. This means there is trans fat  in the product.  Where can I learn more?   American Heart Association   http://www.heart.org/HEARTORG/Conditions/Cholesterol/PreventionTreatmentofHighCholesterol/Know-Your-Fats_[facility]_305628_Article.jsp   Last Reviewed Date   2021-10-05  Consumer Information Use and Disclaimer   This information is not specific medical advice and does not replace information you receive from your health care provider. This is only a brief summary of general information. It does NOT include all information about conditions, illnesses, injuries, tests, procedures, treatments, therapies, discharge instructions or life-style choices that may apply to you. You must talk with your health care provider for complete information about your health and treatment options. This information should not be used to decide whether or not to accept your health care providers advice, instructions or recommendations. Only your health care provider has the knowledge and training to provide advice that is right for you.   Copyright   Copyright © 2021 [employer] and its affiliates and/or licensors. All rights reserved.    Patient Education       Tinea Capitis Discharge Instructions   About this topic   Tinea capitis is also called ringworm of the scalp. It is caused by a germ. It is not caused by a worm. The infected skin is often shaped like a ring with reddish edges. The center may be flaky, dry, and itchy. You may also have bald patches on your scalp. There may be raised sores with draining pus that cause thick crusty areas on the scalp. You can have ringworm on all parts of your body.  This infection spreads easily from one person to another. You can get it by touching other people or by touching things that they have touched. The germs can also be spread by pets. Toddlers and young children often have this infection. Tinea capitis is treated by drugs taken by mouth and with lotions or shampoos made with certain drugs.  What care is needed at home?    Ask your doctor what you need to do when you go home. Make sure you ask questions if you do not understand what the doctor says. This way you will know what you need to do.  Follow your doctor's directions on how to use any drugs or special shampoos. Make sure to use them properly. Wash your hands before and after using any special shampoos.  Try not to scratch your head.  Wash your hair often.  Wash towels in warm, soapy water after each use.  Soak baig and hair brushes in a solution of half bleach and half water for 1 hour.  Do not share baig, brushes, washcloths, towels, hats, or helmets. This could spread the infection to other people.  What follow-up care is needed?   Your doctor may ask you to make visits to the office to check on your progress. Be sure to keep these visits.   What drugs may be needed?   The doctor may order drugs to:  Fight an infection  Relieve itching  Will physical activity be limited?   Your physical activities will not be limited.  What problems could happen?   Hair loss  Scars on the head  What can be done to prevent this health problem?   Do not share things like hair brushes, baig, hair pieces, hats, bedding, towels, helmets, or clothing.  Keep your skin, hair, and nails clean and dry.  Avoid touching infected animals. If your pet has areas where fur is missing, be sure to have the animal checked by a .  Teach children to wash hands often.  When do I need to call the doctor?   Signs of infection. These include a fever of 100.4°F (38°C) or higher, chills.  Infected area spreads after treatment  Infected area is red, warm, tender, and swollen, or you get sores that break open  You are not feeling better in 2 to 3 days or you are feeling worse  Teach Back: Helping You Understand   The Teach Back Method helps you understand the information we are giving you. After you talk with the staff, tell them in your own words what you learned. This helps to make sure the staff has  described each thing clearly. It also helps to explain things that may have been confusing. Before going home, make sure you can do these:  I can tell you about my condition.  I can tell you how I will care for my skin.  I can tell you what I will do if my infected area is warm, red, tender, swollen, or has sores that break open.  Where can I learn more?   American Academy of Family Physicians  https://familydoctor.org/condition/ringworm/   Center for Disease Control and Prevention  https://www.cdc.gov/fungal/diseases/ringworm/index.html   NHS Choices  http://www.nhs.uk/Conditions/Ringworm/Pages/Symptoms.aspx   Last Reviewed Date   2021-08-16  Consumer Information Use and Disclaimer   This information is not specific medical advice and does not replace information you receive from your health care provider. This is only a brief summary of general information. It does NOT include all information about conditions, illnesses, injuries, tests, procedures, treatments, therapies, discharge instructions or life-style choices that may apply to you. You must talk with your health care provider for complete information about your health and treatment options. This information should not be used to decide whether or not to accept your health care providers advice, instructions or recommendations. Only your health care provider has the knowledge and training to provide advice that is right for you.  Copyright   Copyright © 2021 [employer] and its affiliates and/or licensors. All rights reserved.